# Patient Record
Sex: FEMALE | Race: BLACK OR AFRICAN AMERICAN | Employment: UNEMPLOYED | URBAN - METROPOLITAN AREA
[De-identification: names, ages, dates, MRNs, and addresses within clinical notes are randomized per-mention and may not be internally consistent; named-entity substitution may affect disease eponyms.]

---

## 2018-02-20 ENCOUNTER — OFFICE VISIT (OUTPATIENT)
Dept: INTERNAL MEDICINE | Age: 38
End: 2018-02-20
Attending: STUDENT IN AN ORGANIZED HEALTH CARE EDUCATION/TRAINING PROGRAM

## 2018-02-20 VITALS
BODY MASS INDEX: 19.11 KG/M2 | TEMPERATURE: 98.2 F | OXYGEN SATURATION: 98 % | SYSTOLIC BLOOD PRESSURE: 116 MMHG | RESPIRATION RATE: 20 BRPM | WEIGHT: 122 LBS | DIASTOLIC BLOOD PRESSURE: 69 MMHG | HEART RATE: 77 BPM

## 2018-02-20 DIAGNOSIS — J01.10 ACUTE NON-RECURRENT FRONTAL SINUSITIS: ICD-10-CM

## 2018-02-20 DIAGNOSIS — D64.9 ANEMIA, UNSPECIFIED TYPE: Primary | ICD-10-CM

## 2018-02-20 RX ORDER — AMOXICILLIN AND CLAVULANATE POTASSIUM 875; 125 MG/1; MG/1
1 TABLET, FILM COATED ORAL 2 TIMES DAILY
Qty: 14 TABLET | Refills: 0 | Status: SHIPPED | OUTPATIENT
Start: 2018-02-20 | End: 2018-02-27

## 2018-02-20 ASSESSMENT — ENCOUNTER SYMPTOMS
BACK PAIN: 0
VOMITING: 0
ABDOMINAL PAIN: 0
COUGH: 1
PHOTOPHOBIA: 0
WHEEZING: 0
EYE REDNESS: 0
SORE THROAT: 0
SHORTNESS OF BREATH: 1
HEMOPTYSIS: 0
EYE DISCHARGE: 0
EYE PAIN: 0
SINUS PAIN: 1
SPUTUM PRODUCTION: 1
BLURRED VISION: 0
NAUSEA: 0
STRIDOR: 0
DOUBLE VISION: 0
CONSTIPATION: 0
BLOOD IN STOOL: 0
ORTHOPNEA: 0
DIARRHEA: 0
HEARTBURN: 0

## 2018-02-20 NOTE — PROGRESS NOTES
Department Of Internal Medicine  General Medicine/Primary Care  New Patient Visit    Patient:  Fidel Monroy                                               : 1980  Age: 42 y.o. MRN: [id number]  Date : 2018    History Obtained From:  patient    REASON FOR VISIT:  ED follow up and anemia    HISTORY OF PRESENT ILLNESS:   The patient is a 42 y.o. female who presents with no past medical history presenting with over on week of headaches, cough nasal drainage, coughing with with whitish sputum. Patient has been slightly improving but still has sinus pressure and headache. No sick contacts or recent travel. Patient in the ED was also found to be anemic with Hb 8.8 in the ED. The patient was finishing up her period at the time of her blood draw. Patient does endorse changes in her menstrual period with increased frequency. Patient denies any dark stool or blood in her stool. Past Medical History:    No past medical history on file. Past Surgical History:        Procedure Laterality Date    TUBAL LIGATION         Family History:       Problem Relation Age of Onset    No Known Problems Mother     No Known Problems Father     Seizures Other     Asthma Other     Breast Cancer Other        Social History:   TOBACCO:   reports that she has been smoking. She has been smoking about 1.00 pack per day. She has never used smokeless tobacco.  ETOH:   reports that she drinks alcohol. OCCUPATION:  Stay at home mother    Allergies:  Review of patient's allergies indicates no known allergies. Current Medications:    Prior to Admission medications    Medication Sig Start Date End Date Taking?  Authorizing Provider   amoxicillin-clavulanate (AUGMENTIN) 875-125 MG per tablet Take 1 tablet by mouth 2 times daily for 7 days 18 Yes Marion Wadsworth MD   ferrous sulfate (EMANUEL-CINDI) 325 (65 Fe) MG tablet Take 1 tablet by mouth daily (with breakfast) 18   Melina Baez MD   Pseudoephedrine-APAP-DM (DAYQUIL MULTI-SYMPTOM PO) Take by mouth    Historical Provider, MD   ibuprofen (ADVIL;MOTRIN) 600 MG tablet Take 1 tablet by mouth every 6 hours as needed for Pain 10/18/17   Jovon Marshall MD       Review of Systems   Constitutional: Positive for fever and malaise/fatigue. Negative for chills and weight loss. HENT: Positive for congestion and sinus pain. Negative for ear discharge, ear pain, hearing loss, nosebleeds, sore throat and tinnitus. Eyes: Negative for blurred vision, double vision, photophobia, pain, discharge and redness. Respiratory: Positive for cough, sputum production and shortness of breath. Negative for hemoptysis, wheezing and stridor. Cardiovascular: Positive for chest pain. Negative for palpitations, orthopnea, claudication, leg swelling and PND. Gastrointestinal: Negative for abdominal pain, blood in stool, constipation, diarrhea, heartburn, melena, nausea and vomiting. Genitourinary: Negative for dysuria, flank pain, frequency, hematuria and urgency. Musculoskeletal: Negative for back pain, falls, joint pain, myalgias and neck pain. Skin: Negative for itching and rash. Neurological: Positive for headaches. Negative for dizziness, tingling, tremors, sensory change and speech change. Psychiatric/Behavioral: Negative for depression and suicidal ideas. Physical Exam:      Vitals: /69   Pulse 77   Temp 98.2 °F (36.8 °C)   Resp 20   Wt 122 lb (55.3 kg)   LMP 02/05/2018   SpO2 98%   BMI 19.11 kg/m²     Body mass index is 19.11 kg/m². Wt Readings from Last 3 Encounters:   02/20/18 122 lb (55.3 kg)   02/11/18 125 lb (56.7 kg)   10/18/17 123 lb (55.8 kg)     Physical Exam   Constitutional: She is oriented to person, place, and time. She appears well-developed and well-nourished. HENT:   Head: Normocephalic and atraumatic. Eyes: Conjunctivae are normal. Pupils are equal, round, and reactive to light. Neck: Normal range of motion. No JVD present.

## 2018-03-05 DIAGNOSIS — J01.10 ACUTE NON-RECURRENT FRONTAL SINUSITIS: ICD-10-CM

## 2018-03-05 DIAGNOSIS — D64.9 ANEMIA, UNSPECIFIED TYPE: ICD-10-CM

## 2018-03-05 LAB
BASOPHILS ABSOLUTE: 0 K/UL (ref 0–0.2)
BASOPHILS RELATIVE PERCENT: 0.4 %
EOSINOPHILS ABSOLUTE: 0.1 K/UL (ref 0–0.6)
EOSINOPHILS RELATIVE PERCENT: 1.9 %
FERRITIN: 6.8 NG/ML (ref 15–150)
HCT VFR BLD CALC: 30.2 % (ref 36–48)
HEMOGLOBIN: 9.8 G/DL (ref 12–16)
IMMATURE RETIC FRACT: 0.46 (ref 0.21–0.37)
IRON SATURATION: 7 % (ref 15–50)
IRON: 34 UG/DL (ref 37–145)
LYMPHOCYTES ABSOLUTE: 1.9 K/UL (ref 1–5.1)
LYMPHOCYTES RELATIVE PERCENT: 42.6 %
MCH RBC QN AUTO: 27.9 PG (ref 26–34)
MCHC RBC AUTO-ENTMCNC: 32.3 G/DL (ref 31–36)
MCV RBC AUTO: 86.3 FL (ref 80–100)
MONOCYTES ABSOLUTE: 0.4 K/UL (ref 0–1.3)
MONOCYTES RELATIVE PERCENT: 9.8 %
NEUTROPHILS ABSOLUTE: 2 K/UL (ref 1.7–7.7)
NEUTROPHILS RELATIVE PERCENT: 45.3 %
PDW BLD-RTO: 19.7 % (ref 12.4–15.4)
PLATELET # BLD: 470 K/UL (ref 135–450)
PMV BLD AUTO: 7.3 FL (ref 5–10.5)
RBC # BLD: 3.5 M/UL (ref 4–5.2)
RETICULOCYTE ABSOLUTE COUNT: 0.05 M/UL (ref 0.02–0.1)
RETICULOCYTE COUNT PCT: 1.35 % (ref 0.5–2.18)
TOTAL IRON BINDING CAPACITY: 460 UG/DL (ref 260–445)
WBC # BLD: 4.5 K/UL (ref 4–11)

## 2018-03-20 ENCOUNTER — OFFICE VISIT (OUTPATIENT)
Dept: INTERNAL MEDICINE | Age: 38
End: 2018-03-20
Attending: STUDENT IN AN ORGANIZED HEALTH CARE EDUCATION/TRAINING PROGRAM

## 2018-03-20 VITALS
WEIGHT: 125.2 LBS | BODY MASS INDEX: 19.65 KG/M2 | DIASTOLIC BLOOD PRESSURE: 87 MMHG | OXYGEN SATURATION: 100 % | TEMPERATURE: 97.8 F | SYSTOLIC BLOOD PRESSURE: 127 MMHG | RESPIRATION RATE: 16 BRPM | HEIGHT: 67 IN | HEART RATE: 64 BPM

## 2018-03-20 DIAGNOSIS — D50.0 IRON DEFICIENCY ANEMIA DUE TO CHRONIC BLOOD LOSS: ICD-10-CM

## 2018-03-20 DIAGNOSIS — G47.00 INSOMNIA, UNSPECIFIED TYPE: Primary | ICD-10-CM

## 2018-03-20 RX ORDER — IRON POLYSACCHARIDE COMPLEX 150 MG
150 CAPSULE ORAL 2 TIMES DAILY
Qty: 60 CAPSULE | Refills: 3 | Status: SHIPPED | OUTPATIENT
Start: 2018-03-20 | End: 2019-09-10

## 2018-03-20 ASSESSMENT — ENCOUNTER SYMPTOMS
EYE PAIN: 0
SPUTUM PRODUCTION: 0
HEMOPTYSIS: 0
ABDOMINAL PAIN: 0
COUGH: 0
CONSTIPATION: 0
VOMITING: 0
DIARRHEA: 0
ORTHOPNEA: 0
NAUSEA: 0
HEARTBURN: 0
BACK PAIN: 0
PHOTOPHOBIA: 0
BLURRED VISION: 0
DOUBLE VISION: 0

## 2018-03-20 NOTE — PROGRESS NOTES
[address] [facility]       NURSING PROGRESS NOTE      March 20, 2018  Tennis Schimke    Chief Complaint:   Chief Complaint   Patient presents with    Follow-up     anemia       Constitutional: alert and oriented; calm; denies fever,chills,illness; weight stable. Eyes: negative  Ears, nose, mouth, throat, and face: negative  states recent URI and no symptoms now. Respiratory: negative  Cardiovascular: negative  Gastrointestinal: negative  Genitourinary: negative  Integument/breast: negative  Hematologic/lymphatic: states she is here to followup on anemia; she has consulted with GYN doctor at [facility] and they are seeking approval for medication to decrease menses flow contributing to the anemia. She stopped taking the Ferrous Sulfate one week ago due to GI upset.   Musculoskeletal: negative  Neurological: negative  Diabetes: No    Pain Assessment:  Pain Present: no  Pain Score: 0  Pain Quality/Description: no c/o pain    Mobility/Safety/Self-Care:  Steady Gait: Yes  History of Falls: No   History of a Fall within the last 30 days No  Assistive Device: None  Poor Hygiene: No    Psychosocial:   Depression: No  If YES,    Does Patient express current thoughts of harming self or others?: No  Anxious: No  Insomnia: Yes  Inappropriate Behavior: No  Alcohol Abuse: no  Substance Abuse: no  Signs of Physical Abuse: No  Signs of Emotional Abuse: No    Educational:  Identify the learner who is being assessed for education:  patient                    Ability to Learn:  Exhibits ability to grasp concepts and respond to questions: High  Ready to Learn: Yes  calm   Preferred Method of Learning:  written  Barriers to Learning: Verbalizes interest  Special Considerations due to cultural, Methodist, spiritual beliefs:  No  Language:  English  :  No    Note:         10:42 AM 3/20/2018
She is alert and oriented to person, place, and time. No cranial nerve deficit. Coordination normal.   Skin: Skin is warm and dry. No erythema. LABS:    Chemistry:  No results for input(s): BUN, CREATININE, NA, K, CO2, CL, MG, PHOS, AST, ALT, ALB, PROT in the last 72 hours. Invalid input(s): GLU, CA, TBILI, DBILI, ALP, GLUFASTING    No results for input(s): ALKPHOS, ALT, AST, PROT, BILITOT, BILIDIR, LABALBU in the last 72 hours. [unfilled]    No results for input(s): [place], LABMICR, MICROALBUR, Tobi Groom in the last 72 hours. No results found for: TSHFT4, TSH    Hematology:  No results for input(s): WBC, HGB, HCT, PLT, MCV, MCH, MCHC, RDW, EOSABS in the last 72 hours. Invalid input(s): NEUTP, LYMPHP, MONOSP, EOSP, BASOP, NEUTABS, LYMPHABS, MONOABS, BASOABS    Lab Results   Component Value Date    IRON 34 (L) 03/05/2018    TIBC 460 (H) 03/05/2018    FERRITIN 6.8 (L) 03/05/2018       Lipid:  No results found for: CHOL, HDL, LDLCALC, TRIG    U/A:  Lab Results   Component Value Date    LABMICR Yes 10/18/2017       Imaging:   No results found. Active Problems:     1. Iron deficiency anemia  2. Insomnia  3. Acute sinusitis    Assessment/Plan:   1. Iron deficiency anemia likely from menstrual cycle  - Patient with increase frequency of her periods with no bloody or melenotic stools. - Iron studies consistent with iron deficiency anemia  - Iron supplementation switched to Ferrex 150 BID    - Patient is establishing care with [facility]    2. Insomnia  - Likely from poor sleep hygiene  - Patient instructed to limit blue light exposure  - educated patient that bed is for sleep  - recommend over the coutner Melatonin 3-5 mg per night prn    3. Acute sinusitis REsolved           Case will be discussed with preceptor.      Rocio Soto MD  Internal Medicine Resident  Pager: [phone]  3/20/2018, 10:47 AM

## 2018-03-20 NOTE — PATIENT INSTRUCTIONS
their patients and/or to serve consumers viewing this service as a supplement to, and not a substitute for, the expertise, skill, knowledge and judgment of healthcare practitioners. The absence of a warning for a given drug or drug combination in no way should be construed to indicate that the drug or drug combination is safe, effective or appropriate for any given patient. [facility] does not assume any responsibility for any aspect of healthcare administered with the aid of information [facility] provides. The information contained herein is not intended to cover all possible uses, directions, precautions, warnings, drug interactions, allergic reactions, or adverse effects. If you have questions about the drugs you are taking, check with your doctor, nurse or pharmacist.  Copyright 4619-2727 [address]: 2.03. Revision date: 7/30/2014. Care instructions adapted under license by [facility] ([facility]). If you have questions about a medical condition or this instruction, always ask your healthcare professional. [place] [address] any warranty or liability for your use of this information.

## 2018-11-23 ENCOUNTER — HOSPITAL ENCOUNTER (EMERGENCY)
Age: 38
Discharge: HOME | End: 2018-11-23
Attending: EMERGENCY MEDICINE
Payer: MEDICAID

## 2018-11-23 VITALS
SYSTOLIC BLOOD PRESSURE: 133 MMHG | TEMPERATURE: 98 F | BODY MASS INDEX: 19.58 KG/M2 | HEART RATE: 110 BPM | WEIGHT: 125 LBS | RESPIRATION RATE: 18 BRPM | DIASTOLIC BLOOD PRESSURE: 100 MMHG | OXYGEN SATURATION: 98 %

## 2018-11-23 DIAGNOSIS — K08.89 PAIN, DENTAL: Primary | ICD-10-CM

## 2018-11-23 PROCEDURE — 6370000000 HC RX 637 (ALT 250 FOR IP): Performed by: EMERGENCY MEDICINE

## 2018-11-23 PROCEDURE — 99282 EMERGENCY DEPT VISIT SF MDM: CPT

## 2018-11-23 RX ORDER — IBUPROFEN 800 MG/1
800 TABLET ORAL EVERY 6 HOURS PRN
Qty: 30 TABLET | Refills: 0 | Status: SHIPPED | OUTPATIENT
Start: 2018-11-23 | End: 2019-09-10

## 2018-11-23 RX ORDER — BUPIVACAINE HYDROCHLORIDE 5 MG/ML
30 INJECTION, SOLUTION EPIDURAL; INTRACAUDAL ONCE
Status: DISCONTINUED | OUTPATIENT
Start: 2018-11-23 | End: 2018-11-23 | Stop reason: HOSPADM

## 2018-11-23 RX ORDER — IBUPROFEN 400 MG/1
800 TABLET ORAL ONCE
Status: COMPLETED | OUTPATIENT
Start: 2018-11-23 | End: 2018-11-23

## 2018-11-23 RX ORDER — PENICILLIN V POTASSIUM 500 MG/1
500 TABLET ORAL 4 TIMES DAILY
Qty: 28 TABLET | Refills: 0 | Status: SHIPPED | OUTPATIENT
Start: 2018-11-23 | End: 2018-11-30

## 2018-11-23 RX ORDER — PENICILLIN V POTASSIUM 500 MG/1
500 TABLET ORAL ONCE
Status: COMPLETED | OUTPATIENT
Start: 2018-11-23 | End: 2018-11-23

## 2018-11-23 RX ORDER — CHLORHEXIDINE GLUCONATE 0.12 MG/ML
15 RINSE ORAL 2 TIMES DAILY
Qty: 420 ML | Refills: 0 | Status: SHIPPED | OUTPATIENT
Start: 2018-11-23 | End: 2018-12-07

## 2018-11-23 RX ADMIN — IBUPROFEN 800 MG: 400 TABLET, FILM COATED ORAL at 11:22

## 2018-11-23 RX ADMIN — PENICILLIN V POTASIUM 500 MG: 500 TABLET OROPHARYNGEAL at 11:21

## 2018-11-23 ASSESSMENT — ENCOUNTER SYMPTOMS
ABDOMINAL PAIN: 0
SHORTNESS OF BREATH: 0
BACK PAIN: 0

## 2018-11-23 ASSESSMENT — PAIN SCALES - GENERAL
PAINLEVEL_OUTOF10: 10
PAINLEVEL_OUTOF10: 10

## 2018-11-23 ASSESSMENT — PAIN DESCRIPTION - LOCATION: LOCATION: TEETH

## 2018-11-23 NOTE — ED PROVIDER NOTES
14 days     Dispense:  420 mL     Refill:  0         MEDICAL DECISION MAKING / ASSESSMENT / Dorita Edilberto is a 34 y.o. female presents to the emergency department with pain associated with a broken tooth. No evidence of significant infection or abscess that would require IV antibiotics or drainage. We will start her on penicillin and Peridex, as this may be beneficial given the acute change in her symptoms. She had significant relief of her symptoms in the emergency department with ibuprofen, therefore that will be continued. Clinical Impression     1.  Pain, dental        Disposition     PATIENT REFERRED TO:  Dentist of choice      For re-evaluation, follow-up, and discuss ED visit and probable dental extraction (pulling your tooth)      DISCHARGE MEDICATIONS:  Discharge Medication List as of 11/23/2018  1:00 PM      START taking these medications    Details   penicillin v potassium (VEETID) 500 MG tablet Take 1 tablet by mouth 4 times daily for 7 days, Disp-28 tablet, R-0Print      chlorhexidine (PERIDEX) 0.12 % solution Take 15 mLs by mouth 2 times daily for 14 days, Disp-420 mL, R-0Print             DISPOSITION Decision To Discharge 11/23/2018 12:51:16 PM          Binu Acosta MD  11/23/18 1524

## 2019-09-10 ENCOUNTER — HOSPITAL ENCOUNTER (EMERGENCY)
Age: 39
Discharge: HOME | End: 2019-09-10
Attending: EMERGENCY MEDICINE
Payer: MEDICAID

## 2019-09-10 VITALS
HEIGHT: 67 IN | TEMPERATURE: 98.2 F | HEART RATE: 58 BPM | RESPIRATION RATE: 15 BRPM | DIASTOLIC BLOOD PRESSURE: 96 MMHG | BODY MASS INDEX: 19.3 KG/M2 | OXYGEN SATURATION: 100 % | SYSTOLIC BLOOD PRESSURE: 150 MMHG | WEIGHT: 123 LBS

## 2019-09-10 DIAGNOSIS — S05.01XA ABRASION OF RIGHT CORNEA, INITIAL ENCOUNTER: Primary | ICD-10-CM

## 2019-09-10 PROCEDURE — 99282 EMERGENCY DEPT VISIT SF MDM: CPT

## 2019-09-10 RX ORDER — ERYTHROMYCIN 5 MG/G
OINTMENT OPHTHALMIC
Qty: 1 G | Refills: 0 | Status: SHIPPED | OUTPATIENT
Start: 2019-09-10 | End: 2021-08-17

## 2019-09-10 RX ORDER — TETRACAINE HYDROCHLORIDE 5 MG/ML
1 SOLUTION OPHTHALMIC ONCE
Status: DISCONTINUED | OUTPATIENT
Start: 2019-09-10 | End: 2019-09-10 | Stop reason: HOSPADM

## 2019-09-10 ASSESSMENT — PAIN DESCRIPTION - FREQUENCY: FREQUENCY: CONTINUOUS

## 2019-09-10 ASSESSMENT — ENCOUNTER SYMPTOMS
EYE REDNESS: 1
EYE PAIN: 1

## 2019-09-10 ASSESSMENT — PAIN DESCRIPTION - LOCATION: LOCATION: EYE

## 2019-09-10 ASSESSMENT — PAIN DESCRIPTION - DESCRIPTORS: DESCRIPTORS: BURNING

## 2019-09-10 ASSESSMENT — PAIN DESCRIPTION - ORIENTATION: ORIENTATION: RIGHT

## 2019-09-10 ASSESSMENT — PAIN DESCRIPTION - PAIN TYPE: TYPE: ACUTE PAIN

## 2019-09-10 NOTE — ED PROVIDER NOTES
[address] [facility]          ATTENDING PHYSICIAN NOTE       Date of evaluation: 9/10/2019    Chief Complaint     Eye Problem      History of Present Illness     Jeri Spence is a 34 y.o. female with right eye irritation. Patient states she began having irritation in the right eye earlier this evening. She cannot think of a specific event but feels as though she may have had a foreign body in the eye or accidentally scratched it. She does not wear contacts. She has had \" an eyelid cyst\" drained on that I previously but no surgeries on the eye itself. No fevers, she does have a mild associated headache. She notes generally blurred vision due to the tearing in her eye. Review of Systems     Review of Systems   Constitutional: Negative for fatigue. Eyes: Positive for pain and redness. Past Medical, Surgical, Family, and Social History     She has no past medical history on file. She has a past surgical history that includes Tubal ligation. Her family history includes Asthma in an other family member; Breast Cancer in an other family member; No Known Problems in her father and mother; Seizures in an other family member. She reports that she has been smoking. She has been smoking about 1.00 pack per day. She has never used smokeless tobacco. She reports that she drinks alcohol. She reports that she has current or past drug history. Drug: Marijuana. Medications     Previous Medications    No medications on file       Allergies     She has No Known Allergies. Physical Exam     INITIAL VITALS: BP: (!) 150/96, Temp: 98.2 °F (36.8 °C), Pulse: 58, Resp: 15, SpO2: 100 %     Nursing note and vitals reviewed. General:  Adult female, alert and appropriately interactive. In no distress. HENT: Normocephalic and atraumatic. External ears normal. Nose appears normal externally. Eyes: Conjunctivae injected OD. No obvious foreign body on eyelid eversion.   Fluorescein stable condition with written and verbal instructions for which to return to the ED. Clinical Impression     1.  Abrasion of right cornea, initial encounter        Disposition     PATIENT REFERRED TO:  [facility] Emergency Department  [address] [place] [address]  [phone]    If symptoms worsen      DISCHARGE MEDICATIONS:  New Prescriptions    ERYTHROMYCIN [facility]) 5 MG/GM OPHTHALMIC OINTMENT    Apply to affected eye three times daily for two days       DISPOSITION Decision To Discharge 09/10/2019 04:26:40 AM       Dakota Burdick MD  09/10/19 9156

## 2020-10-20 ENCOUNTER — HOSPITAL ENCOUNTER (EMERGENCY)
Age: 40
Discharge: HOME | End: 2020-10-20
Attending: EMERGENCY MEDICINE
Payer: COMMERCIAL

## 2020-10-20 ENCOUNTER — APPOINTMENT (OUTPATIENT)
Dept: GENERAL RADIOLOGY | Age: 40
End: 2020-10-20
Payer: COMMERCIAL

## 2020-10-20 VITALS
TEMPERATURE: 98.3 F | BODY MASS INDEX: 19.62 KG/M2 | WEIGHT: 125 LBS | HEART RATE: 84 BPM | DIASTOLIC BLOOD PRESSURE: 96 MMHG | OXYGEN SATURATION: 100 % | HEIGHT: 67 IN | SYSTOLIC BLOOD PRESSURE: 140 MMHG | RESPIRATION RATE: 18 BRPM

## 2020-10-20 PROCEDURE — 99283 EMERGENCY DEPT VISIT LOW MDM: CPT

## 2020-10-20 PROCEDURE — 72100 X-RAY EXAM L-S SPINE 2/3 VWS: CPT

## 2020-10-20 PROCEDURE — 73502 X-RAY EXAM HIP UNI 2-3 VIEWS: CPT

## 2020-10-20 PROCEDURE — 6370000000 HC RX 637 (ALT 250 FOR IP): Performed by: NURSE PRACTITIONER

## 2020-10-20 PROCEDURE — 6360000002 HC RX W HCPCS: Performed by: NURSE PRACTITIONER

## 2020-10-20 PROCEDURE — 96372 THER/PROPH/DIAG INJ SC/IM: CPT

## 2020-10-20 RX ORDER — DIAZEPAM 5 MG/1
5 TABLET ORAL ONCE
Status: COMPLETED | OUTPATIENT
Start: 2020-10-20 | End: 2020-10-20

## 2020-10-20 RX ORDER — HYDROCODONE BITARTRATE AND ACETAMINOPHEN 5; 325 MG/1; MG/1
2 TABLET ORAL ONCE
Status: COMPLETED | OUTPATIENT
Start: 2020-10-20 | End: 2020-10-20

## 2020-10-20 RX ORDER — DIAZEPAM 5 MG/1
5 TABLET ORAL EVERY 8 HOURS PRN
Qty: 10 TABLET | Refills: 0 | Status: SHIPPED | OUTPATIENT
Start: 2020-10-20 | End: 2020-10-30

## 2020-10-20 RX ORDER — LIDOCAINE 4 G/G
1 PATCH TOPICAL ONCE
Status: DISCONTINUED | OUTPATIENT
Start: 2020-10-20 | End: 2020-10-21 | Stop reason: HOSPADM

## 2020-10-20 RX ORDER — KETOROLAC TROMETHAMINE 30 MG/ML
15 INJECTION, SOLUTION INTRAMUSCULAR; INTRAVENOUS ONCE
Status: COMPLETED | OUTPATIENT
Start: 2020-10-20 | End: 2020-10-20

## 2020-10-20 RX ORDER — LIDOCAINE 50 MG/G
1 PATCH TOPICAL DAILY
Qty: 30 PATCH | Refills: 0 | Status: SHIPPED | OUTPATIENT
Start: 2020-10-20 | End: 2021-08-23

## 2020-10-20 RX ORDER — NAPROXEN 500 MG/1
500 TABLET ORAL 2 TIMES DAILY PRN
Qty: 40 TABLET | Refills: 0 | Status: SHIPPED | OUTPATIENT
Start: 2020-10-20 | End: 2022-08-01

## 2020-10-20 RX ADMIN — KETOROLAC TROMETHAMINE 15 MG: 30 INJECTION, SOLUTION INTRAMUSCULAR at 21:08

## 2020-10-20 RX ADMIN — HYDROCODONE BITARTRATE AND ACETAMINOPHEN 2 TABLET: 5; 325 TABLET ORAL at 22:35

## 2020-10-20 RX ADMIN — DIAZEPAM 5 MG: 5 TABLET ORAL at 21:07

## 2020-10-20 ASSESSMENT — ENCOUNTER SYMPTOMS
NAUSEA: 0
RESPIRATORY NEGATIVE: 1
VOMITING: 0
DIARRHEA: 0
BACK PAIN: 1
ABDOMINAL PAIN: 0

## 2020-10-20 ASSESSMENT — PAIN SCALES - GENERAL
PAINLEVEL_OUTOF10: 7
PAINLEVEL_OUTOF10: 10
PAINLEVEL_OUTOF10: 6

## 2020-10-20 ASSESSMENT — PAIN DESCRIPTION - DESCRIPTORS: DESCRIPTORS: ACHING;SHARP;STABBING

## 2020-10-20 ASSESSMENT — PAIN DESCRIPTION - PAIN TYPE: TYPE: ACUTE PAIN

## 2020-10-20 ASSESSMENT — PAIN DESCRIPTION - FREQUENCY: FREQUENCY: CONTINUOUS

## 2020-10-20 ASSESSMENT — PAIN DESCRIPTION - LOCATION: LOCATION: HIP

## 2020-10-20 ASSESSMENT — PAIN DESCRIPTION - ORIENTATION: ORIENTATION: LEFT

## 2020-10-21 NOTE — ED PROVIDER NOTES
[address] [facility] [address] ENCOUNTER          NURSE PRACTITIONER NOTE       Date of evaluation: 10/20/2020    Chief Complaint     Hip Pain and Leg Pain      History of Present Illness     Willis Perla is a 37 y.o. female who presents to the emergency department with a complaint of ongoing left-sided back pain with radiation down her left posterior leg, no associated bowel or bladder incontinence. This been ongoing for the past 2 weeks and she redecorated her home and moved furniture. She denies any injuries or falls. Denies associated numbness or tingling. Tried ibuprofen without relief as well as Aleve (tried these for 2-3 doses). States she presents today because when she coughed in her car she felt acute sudden onset of shooting pain down her left leg, and then when she tried to get out of her car at home she was unable to stand due to the pain. She currently to pain a 9 out of 10, sharp and stabbing in nature and worse with attempts at movement. Denies history of IV drug use. Review of Systems     Review of Systems   Constitutional: Negative. Respiratory: Negative. Cardiovascular: Negative. Gastrointestinal: Negative for abdominal pain, diarrhea, nausea and vomiting. Genitourinary: Negative for dysuria, flank pain, frequency and urgency. Musculoskeletal: Positive for back pain. Skin: Negative. Allergic/Immunologic: Negative for immunocompromised state. Neurological: Negative for numbness. Hematological: Does not bruise/bleed easily. Psychiatric/Behavioral: Negative. Past Medical, Surgical, Family, and Social History     She has no past medical history on file. She has a past surgical history that includes Tubal ligation. Her family history includes Asthma in an other family member; Breast Cancer in an other family member; No Known Problems in her father and mother; Seizures in an other family member. She reports that she has been smoking.  She has been smoking about 1.00 pack per day. She has never used smokeless tobacco. She reports current alcohol use. She reports current drug use. Drug: Marijuana. Medications     Discharge Medication List as of 10/20/2020 10:38 PM      CONTINUE these medications which have NOT CHANGED    Details   erythromycin (ROMYCIN) 5 MG/GM ophthalmic ointment Apply to affected eye three times daily for two days, Disp-1 g, R-0, Print             Allergies     She has No Known Allergies. Physical Exam     INITIAL VITALS: BP: (!) 166/94, Temp: 98.3 °F (36.8 °C), Pulse: 84, Resp: 18, SpO2: 98 %   Physical Exam  Vitals signs and nursing note reviewed. Constitutional:       Appearance: Normal appearance. Musculoskeletal:         General: Tenderness present. Comments: TTP of the left lumbar paraspinal musculature and SI joint with pain reproduced over palpation of the SI joint. +2 DTRs, intact sensation and intact distal pulses. patient unable to sit up on the side of the bed without active spasming and increased pain. After pain control, gait noted to be without ataxia. Skin:     General: Skin is warm and dry. Findings: No rash. Neurological:      Mental Status: She is alert and oriented to person, place, and time. Psychiatric:         Mood and Affect: Mood normal.         Behavior: Behavior normal.         Diagnostic Results     RADIOLOGY:  XR HIP 2-3 VW W PELVIS LEFT   Final Result      No acute osseous findings. XR LUMBAR SPINE (2-3 VIEWS)   Final Result      No acute osseous findings. LABS:   No results found for this visit on 10/20/20. RECENT VITALS:  BP: (!) 140/96, Temp: 98.3 °F (36.8 °C), Pulse: 84, Resp: 18, SpO2: 100 %       ED Course     Nursing Notes, Past Medical Hx, Past Surgical Hx, Social Hx, Allergies, and Family Hx were reviewed.     The patient was given the following medications:  Orders Placed This Encounter   Medications    lidocaine 4 % external patch 1 patch    ketorolac (TORADOL) injection 15 mg    diazePAM (VALIUM) tablet 5 mg    HYDROcodone-acetaminophen (NORCO) 5-325 MG per tablet 2 tablet    diazePAM (VALIUM) 5 MG tablet     Sig: Take 1 tablet by mouth every 8 hours as needed (muscle tightness/back pain) for up to 10 days. Dispense:  10 tablet     Refill:  0    naproxen (NAPROSYN) 500 MG tablet     Sig: Take 1 tablet by mouth 2 times daily as needed for Pain     Dispense:  40 tablet     Refill:  0    lidocaine (LIDODERM) 5 %     Sig: Place 1 patch onto the skin daily 12 hours on, 12 hours off. Dispense:  30 patch     Refill:  0            CONSULTS:  None    MEDICAL DECISION MAKING / ASSESSMENT / [place] An[place]ell is a 37 y.o. female who presents with complaints as noted in HPI. Patient presents to the emergency department with a complaint of 2 weeks of left-sided back pain, hip pain with radiation to her leg. No red flags. Patient refuses to move her hip initially, however after Lidoderm patch, Toradol and Valium is able to move her hip with pain, unable to ambulate. X-rays of the hip and the lumbar spine were obtained due to patient's significant pain, these are unremarkable. I am not concerned for cauda equina, cord compression or epidural abscess. Patient has been walking for the past 2 weeks, not concerned for an occult injury. Patient will be discharged home with prescriptions for scheduled Naprosyn, Lidoderm patches and Valium to help with muscle spasms. Instructed to follow-up with her primary care provider in 1 to 2 weeks, given strict return precautions which she verbalized understanding of. This patient was also evaluated by the attending physician. All care plans were discussed and agreed upon. Clinical Impression     1.  Sciatica of left side        Disposition     PATIENT REFERRED TO:  [facility] Emergency Department  [address] [facility] [address]  [phone]    If symptoms worsen      DISCHARGE MEDICATIONS:  Discharge Medication List as of 10/20/2020 10:38 PM      START taking these medications    Details   diazePAM (VALIUM) 5 MG tablet Take 1 tablet by mouth every 8 hours as needed (muscle tightness/back pain) for up to 10 days. , Disp-10 tablet,R-0Print      naproxen (NAPROSYN) 500 MG tablet Take 1 tablet by mouth 2 times daily as needed for Pain, Disp-40 tablet,R-0Print      lidocaine (LIDODERM) 5 % Place 1 patch onto the skin daily 12 hours on, 12 hours off., Disp-30 patch,R-0Print             DISPOSITION  Home in stable condition      ELIEZER Dickson - CNP  10/20/20 9822

## 2020-10-21 NOTE — ED NOTES
Discharge instructions reviewed with patient. Patient discharged home with friend.        Connor Medrano RN  10/20/20 2770

## 2021-07-31 ENCOUNTER — HOSPITAL ENCOUNTER (EMERGENCY)
Age: 41
Discharge: HOME | End: 2021-07-31
Attending: EMERGENCY MEDICINE
Payer: COMMERCIAL

## 2021-07-31 VITALS
TEMPERATURE: 99.3 F | BODY MASS INDEX: 21.34 KG/M2 | HEART RATE: 84 BPM | HEIGHT: 64 IN | SYSTOLIC BLOOD PRESSURE: 153 MMHG | WEIGHT: 125 LBS | OXYGEN SATURATION: 99 % | RESPIRATION RATE: 16 BRPM | DIASTOLIC BLOOD PRESSURE: 108 MMHG

## 2021-07-31 DIAGNOSIS — L03.116 CELLULITIS OF LEFT LOWER EXTREMITY: Primary | ICD-10-CM

## 2021-07-31 PROCEDURE — 99282 EMERGENCY DEPT VISIT SF MDM: CPT

## 2021-07-31 RX ORDER — CEPHALEXIN 500 MG/1
500 CAPSULE ORAL 4 TIMES DAILY
Qty: 40 CAPSULE | Refills: 0 | Status: SHIPPED | OUTPATIENT
Start: 2021-07-31 | End: 2021-08-10

## 2021-07-31 RX ORDER — IBUPROFEN 600 MG/1
600 TABLET ORAL EVERY 6 HOURS PRN
Qty: 30 TABLET | Refills: 0 | Status: SHIPPED | OUTPATIENT
Start: 2021-07-31 | End: 2022-08-01

## 2021-07-31 ASSESSMENT — PAIN DESCRIPTION - LOCATION: LOCATION: LEG

## 2021-07-31 ASSESSMENT — PAIN SCALES - GENERAL: PAINLEVEL_OUTOF10: 6

## 2021-07-31 ASSESSMENT — PAIN DESCRIPTION - PAIN TYPE: TYPE: ACUTE PAIN

## 2021-07-31 NOTE — ED PROVIDER NOTES
[address] [facility]          ATTENDING PHYSICIAN NOTE       Date of evaluation: 7/31/2021    Chief Complaint     Bleeding/Bruising ([address] ON BILATERAL OUTTER THIGHS FOR X1 YEAR)      History of Present Illness     Summer Marcano is a 41 y.o. female who presents to the emergency department with the complaint of thigh pain. The patient reports that she has had 2 skin lesions on her bilateral lateral thighs for the past year and the one on her left lateral thigh became more painful and swollen over the course the past several days. Denies fever chills nausea vomiting. She reports some redness and warmth at the site of the left lateral thigh skin lesion. She denies any known antecedent trauma. Otherwise without complaints. Review of Systems     As documented in the HPI, otherwise all other systems were reviewed and were negative. Past Medical, Surgical, Family, and Social History     She has no past medical history on file. She has a past surgical history that includes Tubal ligation. Her family history includes Asthma in an other family member; Breast Cancer in an other family member; No Known Problems in her father and mother; Seizures in an other family member. She reports that she has been smoking. She has been smoking about 1.00 pack per day. She has never used smokeless tobacco. She reports current alcohol use. She reports current drug use. Drug: Marijuana. Medications     Previous Medications    ERYTHROMYCIN (ROMYCIN) 5 MG/GM OPHTHALMIC OINTMENT    Apply to affected eye three times daily for two days    LIDOCAINE (LIDODERM) 5 %    Place 1 patch onto the skin daily 12 hours on, 12 hours off. NAPROXEN (NAPROSYN) 500 MG TABLET    Take 1 tablet by mouth 2 times daily as needed for Pain       Allergies     She has No Known Allergies.     Physical Exam     INITIAL VITALS: BP: (!) 153/108, Temp: 99.3 °F (37.4 °C), Pulse: 84, Resp: 16, SpO2: 99 %   General: Well-appearing 41year-old sitting in bed no apparent cardiorespiratory distress  HEENT:  head is atraumatic, sclera are clear, oropharynx is nonerythematous, mucus membranes are moist  Neck: Trachea midline  Chest: Nonlabored respirations  Cardiovascular: Regular, rate, and rhythm, 2+ radial pulses bilaterally  Abdominal: Nondistended abdomen  Skin: On the right lateral thigh there is an area of hyperpigmentation and mild induration of the skin, on the left lateral thigh the patient has a large area of induration of the skin with some mild erythema tenderness to palpation and evidence of subcutaneous edema on ultrasound without evidence of focal abscess or fluid collection  Musculoskeletal: no obvious deformities, no tenderness to palpation diffusely  Neurologic:  speech is clear and intact without dysarthria, gait is intact    Diagnostic Results       RADIOLOGY:  No orders to display       LABS:   No results found for this visit on 07/31/21. RECENT VITALS:  BP: (!) 153/108, Temp: 99.3 °F (37.4 °C), Pulse: 84, Resp: 16, SpO2: 99 %         ED Course     Nursing Notes, Past Medical Hx, Past Surgical Hx, Social Hx, Allergies, and Family Hx were reviewed. The patient was given the following medications:  Orders Placed This Encounter   Medications    ibuprofen (ADVIL;MOTRIN) 600 MG tablet     Sig: Take 1 tablet by mouth every 6 hours as needed for Pain     Dispense:  30 tablet     Refill:  0    cephALEXin (KEFLEX) 500 MG capsule     Sig: Take 1 capsule by mouth 4 times daily for 10 days     Dispense:  40 capsule     Refill:  0       CONSULTS:  None    MEDICAL DECISION MAKING / ASSESSMENT / Radha Milagros is a 41 y.o. female complaints of skin lesions.   On physical examination the left lateral thigh demonstrated induration some erythema of the skin and warmth of the skin with ultrasound findings consistent with subcutaneous edema and a clinical picture that appear to be most consistent with likely cellulitis. No abscess was seen. Is unclear why the patient has these lesions in the first place but could be secondary to chronic pressure and/or trauma. The patient will be given a prescription for Keflex discharged home with PCP follow-up and told to return to the emergency department for worsening symptoms. Clinical Impression     1.  Cellulitis of left lower extremity        Disposition     PATIENT REFERRED TO:  [facility]) Pre-Services  [phone]          DISCHARGE MEDICATIONS:  New Prescriptions    CEPHALEXIN (KEFLEX) 500 MG CAPSULE    Take 1 capsule by mouth 4 times daily for 10 days    IBUPROFEN (ADVIL;MOTRIN) 600 MG TABLET    Take 1 tablet by mouth every 6 hours as needed for Pain       DISPOSITION Decision To Discharge 07/31/2021 12:15:42 PM           Destinee Justin MD  07/31/21 4224

## 2021-07-31 NOTE — ED NOTES
Pt presents to the ED from home with c/o bilateral thigh bruising for x1 year.      Karla Hooker RN  07/31/21 9874

## 2021-07-31 NOTE — ED NOTES
Patient prepared for and ready to be discharged. Patient discharged at this time in no acute distress after verbalizing understanding of discharge instructions. Patient left after receiving After Visit Summary instructions.         Lucero Dos Santos RN  07/31/21 7543

## 2021-08-17 ENCOUNTER — OFFICE VISIT (OUTPATIENT)
Dept: PRIMARY CARE CLINIC | Age: 41
End: 2021-08-17
Payer: COMMERCIAL

## 2021-08-17 VITALS
TEMPERATURE: 97.4 F | SYSTOLIC BLOOD PRESSURE: 132 MMHG | HEART RATE: 77 BPM | HEIGHT: 67 IN | WEIGHT: 132.6 LBS | BODY MASS INDEX: 20.81 KG/M2 | OXYGEN SATURATION: 99 % | DIASTOLIC BLOOD PRESSURE: 87 MMHG

## 2021-08-17 DIAGNOSIS — M21.952 ACQUIRED DEFORMITY OF LEFT THIGH: ICD-10-CM

## 2021-08-17 DIAGNOSIS — L98.9 SKIN LESION OF LEFT LEG: Primary | ICD-10-CM

## 2021-08-17 PROCEDURE — 99202 OFFICE O/P NEW SF 15 MIN: CPT | Performed by: FAMILY MEDICINE

## 2021-08-17 PROCEDURE — G8427 DOCREV CUR MEDS BY ELIG CLIN: HCPCS | Performed by: FAMILY MEDICINE

## 2021-08-17 PROCEDURE — 4004F PT TOBACCO SCREEN RCVD TLK: CPT | Performed by: FAMILY MEDICINE

## 2021-08-17 PROCEDURE — G8420 CALC BMI NORM PARAMETERS: HCPCS | Performed by: FAMILY MEDICINE

## 2021-08-17 RX ORDER — DULOXETIN HYDROCHLORIDE 20 MG/1
20 CAPSULE, DELAYED RELEASE ORAL DAILY
COMMUNITY
End: 2021-11-08 | Stop reason: SDUPTHER

## 2021-08-17 RX ORDER — MIRTAZAPINE 15 MG/1
15 TABLET, FILM COATED ORAL NIGHTLY
COMMUNITY
End: 2022-08-01

## 2021-08-17 ASSESSMENT — PATIENT HEALTH QUESTIONNAIRE - PHQ9
SUM OF ALL RESPONSES TO PHQ QUESTIONS 1-9: 0
SUM OF ALL RESPONSES TO PHQ9 QUESTIONS 1 & 2: 0
1. LITTLE INTEREST OR PLEASURE IN DOING THINGS: 0
2. FEELING DOWN, DEPRESSED OR HOPELESS: 0

## 2021-08-17 NOTE — PROGRESS NOTES
[address] [address] PRIMARY CARE  [address] [facility] [address]  Dept: [phone]  Dept Fax: [phone]     8/17/2021      Sneha Seay   1980     Chief Complaint   Patient presents with    Lesion(s)     on both outer thighs,  seen at [facility] ER       HPI  Pt comes in today to establish care. Does not have a PCP. Went to ER on 7/31/21 for c/o spots on her legs. Spots first started over 1.5 years ago. Left side is much worse than the right. Started as itchy rash and progressed from there. No inciting factor that she is aware of. Area is still mildly itchy, burning sensation and now painful at times. Has used OTC vaseline and bandages with no improvement. Bedside US in ER done with possible edema/thickening of the skin, suggestive of cellulitis and therefore treated with keflex. Follows with GYN at [facility] and psychiatrist.     Prior to Visit Medications    Medication Sig Taking? Authorizing Provider   mirtazapine (REMERON) 15 MG tablet Take 15 mg by mouth nightly Yes Historical Provider, MD   DULoxetine (CYMBALTA) 20 MG extended release capsule Take 20 mg by mouth daily Yes Historical Provider, MD   ibuprofen (ADVIL;MOTRIN) 600 MG tablet Take 1 tablet by mouth every 6 hours as needed for Pain Yes Guido Nevarez MD   lidocaine (LIDODERM) 5 % Place 1 patch onto the skin daily 12 hours on, 12 hours off.  Yes ELIEZER Marks CNP   naproxen (NAPROSYN) 500 MG tablet Take 1 tablet by mouth 2 times daily as needed for Pain  ELIEZER Marks CNP        Past Medical History:   Diagnosis Date    Depression         Social History     Tobacco Use    Smoking status: Current Every Day Smoker     Packs/day: 1.00    Smokeless tobacco: Never Used   Substance Use Topics    Alcohol use: Yes     Comment: BOTTLE OF LIQOUR DAILY    Drug use: Yes     Types: Marijuana     Comment: occassional        Past Surgical History:   Procedure Laterality Date    TUBAL LIGATION          No Known Allergies     Family History   Problem Relation Age of Onset    No Known Problems Mother     No Known Problems Father     Seizures Other     Asthma Other     Breast Cancer Other         Patient's past medical history, surgical history, family history, medications, and allergies  were all reviewed and updated as appropriate today. Review of Systems   Constitutional: Negative for fever. Respiratory: Negative for cough and shortness of breath. Cardiovascular: Negative for chest pain. Musculoskeletal: Negative for arthralgias, gait problem and myalgias. Psychiatric/Behavioral: Negative for dysphoric mood. /87 (Cuff Size: Medium Adult)   Pulse 77   Temp 97.4 °F (36.3 °C) (Temporal)   Ht 5' 6.5\" (1.689 m)   Wt 132 lb 9.6 oz (60.1 kg)   LMP 08/10/2021   SpO2 99% Comment: room air  Breastfeeding No   BMI 21.08 kg/m²      Physical Exam  Vitals reviewed. Constitutional:       Appearance: Normal appearance. She is not ill-appearing. Cardiovascular:      Rate and Rhythm: Normal rate and regular rhythm. Heart sounds: No murmur heard. Pulmonary:      Effort: Pulmonary effort is normal.      Breath sounds: Normal breath sounds. Musculoskeletal:      Comments: Right lateral thigh with small area of hyperpigmented macular discoloration, mild, small ~ 2-3 cm    Left lateral thigh with similar area of hyperpigmentation but much larger/more discolored than the right, associated indentation deformity appreciated, TTP, small open area with scabbing, no induration/flutuance/drainage/increased warmth   Skin:     Capillary Refill: Capillary refill takes less than 2 seconds. Neurological:      General: No focal deficit present. Mental Status: She is alert. Psychiatric:         Mood and Affect: Mood normal.                      Assessment:  Encounter Diagnoses   Name Primary?     Skin lesion of left leg Yes    Acquired deformity of left thigh Plan:    - Patient has somewhat unusual lesion on left lateral leg with similar spot on right leg but to much milder degree. Has been worsening for 1.5 years. Treated in ER for cellulitis with no improvement. Unclear etiology but I am concerned re: deformity of the soft tissue/indentation and deeper pathology. Recommended CT femur for further eval. She is aware to schedule this ASAP. I have placed derm referral as well and she was scheduled for 11/30. May need skin biopsy if CT scan normal.     New Prescriptions    No medications on file        Orders Placed This Encounter   Procedures   Royal Maryanne MD, Dermatology, [facility]        Return if symptoms worsen or fail to improve.         [address], DO

## 2021-08-18 ASSESSMENT — ENCOUNTER SYMPTOMS
SHORTNESS OF BREATH: 0
COUGH: 0

## 2021-08-23 ENCOUNTER — OFFICE VISIT (OUTPATIENT)
Dept: DERMATOLOGY | Age: 41
End: 2021-08-23
Payer: COMMERCIAL

## 2021-08-23 VITALS — TEMPERATURE: 98.1 F

## 2021-08-23 DIAGNOSIS — R22.40 SUBCUTANEOUS NODULE OF LOWER EXTREMITY: Primary | ICD-10-CM

## 2021-08-23 DIAGNOSIS — L82.1 DERMATOSIS PAPULOSA NIGRA: ICD-10-CM

## 2021-08-23 PROCEDURE — G8427 DOCREV CUR MEDS BY ELIG CLIN: HCPCS | Performed by: DERMATOLOGY

## 2021-08-23 PROCEDURE — G8420 CALC BMI NORM PARAMETERS: HCPCS | Performed by: DERMATOLOGY

## 2021-08-23 PROCEDURE — 4004F PT TOBACCO SCREEN RCVD TLK: CPT | Performed by: DERMATOLOGY

## 2021-08-23 PROCEDURE — 99203 OFFICE O/P NEW LOW 30 MIN: CPT | Performed by: DERMATOLOGY

## 2021-08-23 NOTE — Clinical Note
Dear Dr. Zuhair Jett,    I had the pleasure of seeing your patient, Mrs Barry Serna , yesterday for a dermatologic evaluation. Following my evaluation, my first impression is panniculitis, possible lupus profundus, however other panniculitides are also on the differential. I recommended we perform incisional biopsy and CTD work-up, however patient declined and wanted to have her CT scan completed before pursuing any of my suggested workup. We will follow up with her once the CT scan is completed. Please don't hesitate to contact me directly with any questions or concerns regarding this patient, or any other future patient needs.     Kind regards,  Krissy Shukla

## 2021-08-23 NOTE — PROGRESS NOTES
Patient's Name: Main Mchugh  MRN: <[id number]>  YOB: 1980  Date of Visit: 8/23/2021  Primary Care Provider: Ulisses Zayas DO  Referring Provider: Keturah Klinefelter Caroli*    Subjective:     Chief Complaint   Patient presents with    New Patient     lesion of leg left       History of Present Illness:  Mani Mchugh an 41 y.o. female who presents in clinic today as a new patient seen in consultation request by Ulisses Zayas DO for a  skin examination and evaluation of lesions on legs. Patient reports developing the lesions on both thighs ~ 1.5 years ago. They started as discoloration, then it started spreading, red, painful Lt worse then Rt. She believes she traumatized the Lt lesion, which caused it to open as a sore. The lesions are very painful, tender and warm to touch. She denies any other rashes, photosensitivity, hair loss, fevers, weight loss, joint pain, headaches, SOB, Raynaud's. When asked, patient denied exposure to cold or application of ice, horse riding or injecting any material.    Past Dermatologic History:  Personal history of non melanoma skin cancer: No   Personal history of melanoma: No  Personal history of abnormal/dysplastic moles: No  Personal history of tanning bed use current: No  Personal history of tanning bed use: No  Personal history of blistering sunburns: No  Personal history of extensive sun exposure: No  Burns easily: No  Practicing sun protective habits:  No     Social History:     Marital status:   Smoking Status: Current every day smoker  Children: Yes 2 Sons  2 Daughters    Type of outdoor activities if any : playing ball with grandsons      Family Skin Disease History:  Patient denies  a family history of non melanoma skin cancer. Patient denies  a family history of abnormal moles  Patient denies  an immediate family history of melanoma.      Past Medical History:  Past Medical History:   Diagnosis Date    Depression        Past Surgical History:  Past Surgical History:   Procedure Laterality Date    TUBAL LIGATION         Past Family History:  Family History   Problem Relation Age of Onset    No Known Problems Mother     No Known Problems Father     Seizures Other     Asthma Other     Breast Cancer Other        Allergies:  No Known Allergies    Current Medications:  Current Outpatient Medications   Medication Sig Dispense Refill    mirtazapine (REMERON) 15 MG tablet Take 15 mg by mouth nightly      DULoxetine (CYMBALTA) 20 MG extended release capsule Take 20 mg by mouth daily      ibuprofen (ADVIL;MOTRIN) 600 MG tablet Take 1 tablet by mouth every 6 hours as needed for Pain 30 tablet 0    naproxen (NAPROSYN) 500 MG tablet Take 1 tablet by mouth 2 times daily as needed for Pain 40 tablet 0     No current facility-administered medications for this visit. Review of Systems:  Constitutional: No fevers, chills or recent illness.    Skin: Skin:As per HPI AND otherwise no new, bleeding or symptomatic skin lesions      Objective:     Vitals:    08/23/21 1121   Temp: 98.1 °F (36.7 °C)     Physical Examination:  General: alert, comfortable, no apparent distress, well-appearing  Psych: alert, oriented and pleasant  Neuro: oriented to person, place, and time  Skin: Areas examined: head including face, lips, conjunctiva and lids, neck, hair/scalp, chest, including breasts and axilla, abdomen, back, buttocks, right upper extremity, left upper extremity, right lower extremity, left lower extremity, left hand, right hand and digits and nails      All areas examined were within normal limits except those listed below with the appropriate assessment and plan    Assessment and Plan (with relevant objective exam findings):     1. Subcutaneous nodules of lower extremities  Location/objective findings: Lt thigh with indurated violaceous/ reddish brown nodules subcutaneous nodules and plaques with peau d'orange texture, atrophy and surrounding erythema. Centrally there is an erosion with overlying hemorrhagic/yellowish crust  Rt lateral thigh with erythema and indurated subcutaneous nodules, violaceous atrophic plaque and surrounding erythema. Both lesions are tender to palpation   I had a very lengthy discussion with the patient regarding the differential diagnosis. I explained that this clinically appears to be panniculitis, either lupus panniculitis (lupus profundus) although not a typical location, alpha-1 antitrypsin, infectious panniculitis, SPTCL versus traumatic panniculitis. I did discuss the need for incisional biopsy and further lab work-up, however patient declined and wanted to have CT scan completed before pursuing biopsy. I did explain to patient that CT scan would show inflammation, however a biopsy would be needed to determine which of the panniculitis this belongs to and to determine best treatment course. Patient will have CT in 2 days and will call to schedule follow up visit. 2. Dermatosis Papulosa Nigra  Location: face  Objective Findings: scattered small brown to black stuck on papules    Discussed that these growths are benign and no further treatment necessary      Follow up:  Return visit ASAP or as needed for change in condition. All questions addressed. Procedure:   No procedure performed                  I saw your patient Matthew Heimlich at the date listed above in my Dermatology  clinic in [facility]. Thank you very much for the referral.    My exam findings, assessment and plan can be found in EPIC, I have also attached them below for your convenience. I very much appreciate your referral and the opportunity to participate in this patient's care. Please don't hesitate to contact me with questions or concerns about our visit or management of this patient in the future.      Annemarie Wallace MD, MS

## 2021-08-23 NOTE — LETTER
[facility]  [address] [place] [address]  Phone: [phone]  Fax: [phone]           Brissa Duarte MD      August 24, 2021     Patient: Mani Mchugh   MR Number: <[id number]>   YOB: 1980   Date of Visit: 8/23/2021       Dear Dr. Andrew Santizo: Thank you for referring Mani Mchugh to me for evaluation/treatment. Below are the relevant portions of my assessment and plan of care. If you have questions, please do not hesitate to call me. I look forward to following Angus Jain along with you.     Sincerely,        Brissa Duarte MD    CC providers:  Willard Meehan [place]

## 2021-08-24 PROBLEM — L82.1 DERMATOSIS PAPULOSA NIGRA: Status: ACTIVE | Noted: 2021-08-24

## 2021-08-25 ENCOUNTER — HOSPITAL ENCOUNTER (OUTPATIENT)
Dept: CT IMAGING | Age: 41
Discharge: HOME | End: 2021-08-25
Payer: COMMERCIAL

## 2021-08-25 DIAGNOSIS — M21.952 ACQUIRED DEFORMITY OF LEFT THIGH: ICD-10-CM

## 2021-08-25 DIAGNOSIS — L98.9 SKIN LESION OF LEFT LEG: ICD-10-CM

## 2021-08-25 PROCEDURE — 73701 CT LOWER EXTREMITY W/DYE: CPT

## 2021-08-25 PROCEDURE — 6360000004 HC RX CONTRAST MEDICATION: Performed by: FAMILY MEDICINE

## 2021-08-25 RX ADMIN — IOPAMIDOL 80 ML: 755 INJECTION, SOLUTION INTRAVENOUS at 11:38

## 2021-09-01 ENCOUNTER — TELEPHONE (OUTPATIENT)
Dept: DERMATOLOGY | Age: 41
End: 2021-09-01

## 2021-09-09 ENCOUNTER — OFFICE VISIT (OUTPATIENT)
Dept: DERMATOLOGY | Age: 41
End: 2021-09-09
Payer: COMMERCIAL

## 2021-09-09 DIAGNOSIS — R21 RASH AND NONSPECIFIC SKIN ERUPTION: ICD-10-CM

## 2021-09-09 DIAGNOSIS — R22.9 SUBCUTANEOUS NODULE: Primary | ICD-10-CM

## 2021-09-09 PROCEDURE — 99214 OFFICE O/P EST MOD 30 MIN: CPT | Performed by: DERMATOLOGY

## 2021-09-09 PROCEDURE — G8420 CALC BMI NORM PARAMETERS: HCPCS | Performed by: DERMATOLOGY

## 2021-09-09 PROCEDURE — 4004F PT TOBACCO SCREEN RCVD TLK: CPT | Performed by: DERMATOLOGY

## 2021-09-09 PROCEDURE — G8427 DOCREV CUR MEDS BY ELIG CLIN: HCPCS | Performed by: DERMATOLOGY

## 2021-09-09 NOTE — PROGRESS NOTES
Patient's Name: Stephan Park  MRN: <[id number]>  YOB: 1980  Date of Visit: 9/9/2021  Primary Care Provider: Meg Whitten DO  Referring Provider: No ref. provider found    Subjective:   Chief Complaint:   Follow-up (painful lesions)      History of Present Illness:   Stephan Park is a 41 y.o. female who presents in clinic today for evaluation and consideration of biopsy of her thigh. Last office visit: 8/23/21    At their last visit patient was reluctant to have a biopsy and wanted to wait until she has her CT scan done. She reports her thighs are very painful and ibuprofen is not helping. She reports her daughter reminded her that she had a lesion on her scalp that was biopsied at  several years ago, however never went back for results. She denies any other lesions on her skin except for the thigh lesions. She again denies injections, cold application or horse riding. No headaches, raynaud's, SOB. She does endorse joint pains in her hands. On chart review it was noted that she has been seeing [facility] orthopedics. Past medical and surgical histories, current medications, allergies, social and family histories were reviewed with no change since the last visit        Allergies:  No Known Allergies    Current Medications:  Current Outpatient Medications   Medication Sig Dispense Refill    mirtazapine (REMERON) 15 MG tablet Take 15 mg by mouth nightly      DULoxetine (CYMBALTA) 20 MG extended release capsule Take 20 mg by mouth daily      ibuprofen (ADVIL;MOTRIN) 600 MG tablet Take 1 tablet by mouth every 6 hours as needed for Pain 30 tablet 0    naproxen (NAPROSYN) 500 MG tablet Take 1 tablet by mouth 2 times daily as needed for Pain 40 tablet 0     No current facility-administered medications for this visit. Review of Systems:  Constitutional: No fevers, chills or recent illness.  fatigue  Skin: Skin:As per HPI AND otherwise no new, bleeding or symptomatic skin lesions      Objective: There were no vitals filed for this visit. CT scan results:        1.  About 10 cm area of skin and subcutaneous space infiltration in the medial left thigh is nonspecific and can be seen with infectious, inflammatory, traumatic, or uncommonly neoplastic/lymphoproliferative etiologies. Correlate with clinical findings    and consider histologic sampling as needed. 2.  Mild left inguinal lymphadenopathy, also nonspecific. Physical Examination:  General: alert, very anxious, and tearful  Psych: alert, oriented and pleasant  Neuro: oriented to person, place, and time  Skin: Areas examined: head including face, lips, conjunctiva and lids, neck, hair/scalp, right lower extremity, left lower extremity, left hand, right hand and digits and nails      All areas examined were within normal limits except those listed below with the appropriate assessment and plan    Assessment and Plan (with relevant objective exam findings):     1. Subcutaneous nodules unchanged from previous visit  Had a very lengthy discussion regarding the need for a biopsy. Patient was tearful and declined at this time. She would like me to find out if this can be done while she is sedated. I explained that it is imperative to have the biopsy performed to be able to reach a diagnosis and treat the underlying condition. I will contact general surgery to check if this is a possibility to have an incisional biopsy under sedation. I also explained that we will have to order blood work for CTD. She verbalized understanding. CMP  C3,C4  JORGE reflex to antibody cascade  Lupus anticoagulant  Cardiolipin antibody IGA, IGG and IGM  B2 glycoprotein  DS DNA  JORGE with Hep-2 IgG by IFA  G6PD    I spent 30 minutes     1. Preparing to see the patient ( reviewing records and tests)  2. Performing a medically appropriate examination and evaluation  3.  Counseling and educating the patient/family caregiver  4. Ordering medications, tests, or procedures  5. Referring and communicating with other healthcare professionals  6. Documenting clinical information in the electronic or other health record  7. Independently interpreting results and communicating results to the patient /family/caregiver  8. Care coordination        Follow up:  Return visit soon or as needed for change in condition. All questions addressed.      Procedure:   No procedure performed          Roise Goldberg, MD. MS

## 2021-09-10 ENCOUNTER — TELEPHONE (OUTPATIENT)
Dept: DERMATOLOGY | Age: 41
End: 2021-09-10

## 2021-09-13 ENCOUNTER — TELEPHONE (OUTPATIENT)
Dept: DERMATOLOGY | Age: 41
End: 2021-09-13

## 2021-09-23 ENCOUNTER — OFFICE VISIT (OUTPATIENT)
Dept: DERMATOLOGY | Age: 41
End: 2021-09-23
Payer: COMMERCIAL

## 2021-09-23 VITALS — TEMPERATURE: 96.6 F

## 2021-09-23 DIAGNOSIS — R21 RASH AND NONSPECIFIC SKIN ERUPTION: Primary | ICD-10-CM

## 2021-09-23 PROCEDURE — G8427 DOCREV CUR MEDS BY ELIG CLIN: HCPCS | Performed by: DERMATOLOGY

## 2021-09-23 PROCEDURE — G8420 CALC BMI NORM PARAMETERS: HCPCS | Performed by: DERMATOLOGY

## 2021-09-23 PROCEDURE — 11104 PUNCH BX SKIN SINGLE LESION: CPT | Performed by: DERMATOLOGY

## 2021-09-23 PROCEDURE — 99214 OFFICE O/P EST MOD 30 MIN: CPT | Performed by: DERMATOLOGY

## 2021-09-23 PROCEDURE — 4004F PT TOBACCO SCREEN RCVD TLK: CPT | Performed by: DERMATOLOGY

## 2021-09-23 RX ORDER — CEPHALEXIN 500 MG/1
500 CAPSULE ORAL 4 TIMES DAILY
Qty: 28 CAPSULE | Refills: 0 | Status: SHIPPED | OUTPATIENT
Start: 2021-09-23 | End: 2021-09-30

## 2021-09-23 RX ORDER — CLOBETASOL PROPIONATE 0.5 MG/G
OINTMENT TOPICAL
Qty: 60 G | Refills: 2 | Status: SHIPPED | OUTPATIENT
Start: 2021-09-23 | End: 2022-04-13 | Stop reason: SDUPTHER

## 2021-09-23 NOTE — PROGRESS NOTES
BUN 8 7 - 20 mg/dL    CREATININE 0.7 0.6 - 1.1 mg/dL    GFR Non-African American >60 >60    GFR African American >60 >60    Calcium 9.5 8.3 - 10.6 mg/dL    Total Protein 8.2 6.4 - 8.2 g/dL    Albumin 4.4 3.4 - 5.0 g/dL    Albumin/Globulin Ratio 1.2 1.1 - 2.2    Total Bilirubin 0.4 0.0 - 1.0 mg/dL    Alkaline Phosphatase 88 40 - 129 U/L    ALT 7 (L) 10 - 40 U/L    AST 15 15 - 37 U/L    Globulin 3.8 g/dL   C4 COMPLEMENT   Result Value Ref Range    C4 Complement 9.3 (L) 10.0 - 40.0 mg/dL   C3 COMPLEMENT   Result Value Ref Range    C3 Complement 111.0 90.0 - 180.0 mg/dL   B-2 GLYCOPROTEIN (IGA)   Result Value Ref Range    Beta-2 Glyco 1 IgA <10 0 - 20 ZACH   CARDIOLIPIN ANTIBODY, IGA   Result Value Ref Range    Anticardiolipin IgA <10 0 - 11 APL   LUPUS ANTICOAGULANT   Result Value Ref Range    PTT D 39 32 - 48 sec    PT D 11.9 (L) 12.0 - 15.5 sec    Thrombin Time Not Applicable 39.4 - 90.9 sec    Reptilase Tm Not Applicable <=93.6 sec    Ptt-D Kim Reflex Not Applicable 32 - 48 sec    dRVVT Screen 39 33 - 44 sec    DRVVT,DIL Not Applicable 33 - 44 sec    Lup Anticoag Interp See Note     Hex Phosph Neut Test Not Applicable Negative    Ptt-Heparin Neutralized Not Applicable 32 - 48 sec    PLT NEUTA Not Applicable Negative    DRVVT Confirmation Test Not Applicable Negative ratio   JORGE Reflex to Antibody Cascade   Result Value Ref Range    JORGE POSITIVE (A) Negative   ANTI-DNA ANTIBODY, DOUBLE-STRANDED   Result Value Ref Range    Anti-dsDNA IgG 5 0 - 9 IU/mL   JORGE with HEp-2 IgG by IFA   Result Value Ref Range    Antinuclear Antibody, Hep-2, IGG Detected (H) <1:80    Antinuclear AB Interpretive Comment See Note    JORGE Antibody Cascade   Result Value Ref Range    Anti-Centromere B IgG <0.2 0.0 - 0.9 AI    Anti-Chromatin IgG <0.2 0.0 - 0.9 AI    Anti-JO1 IgG <0.2 0.0 - 0.9 AI    Anti-Ribosomal P IgG <0.2 0.0 - 0.9 AI    Anti-RNP IgG 0.3 0.0 - 0.9 AI    Anti-Smith IgG 0.2 0.0 - 0.9 AI    Anti-SmRNP IgG <0.2 0.0 - 0.9 AI Anti-dsDNA IgG 5 0 - 9 IU/mL    Anti-SCL70 IgG <0.2 0.0 - 0.9 AI    Anti-SS-A IgG 6.8 (H) 0.0 - 0.9 AI    Anti-SS-B IgG <0.2 0.0 - 0.9 AI   Glucose 6 Phosphate Dehydrogenase   Result Value Ref Range    G-6-PD, Quant 12.2 9.9 - 16.6 U/g Hb   Antinuclear AB, Single Pattern   Result Value Ref Range    JORGE Pattern Nuclear Dot (A)     JORGE TITER 1:1280 (A)    Cytoplasmic Pattern   Result Value Ref Range    Cytoplasmic Pattern Titer 1:320 (A)          Physical Examination:  General: alert, comfortable, no apparent distress, well-appearing  Psych: alert, oriented and pleasant and anxious  Neuro: oriented to person, place, and time  Skin: Areas examined: head including face, lips, conjunctiva and lids, neck, hair/scalp, abdomen, right lower extremity, left lower extremity, left hand, right hand and digits and nails      All areas examined were within normal limits except those listed below with the appropriate assessment and plan    Assessment and Plan (with relevant objective exam findings):     1. Rash and non-specific skin eruption  Location:Rt and Lt lateral thighs with indurated violaceous/brown plaques and peau d'orange texture and appearance. Plaque on Lt thigh with erosion and crust     Decided on incisional biopsy today. See procedure note below. The specimen was sent to pathology for diagnosis. We will call patient or send note on Mychart with biopsy results as soon as they are available, and discuss treatment options as needed. Wound care was discussed and written instructions also given to patient. - Keflex 500 mg QID x 7 days  - Mupirocin to biopsy site twice daily x 7 days  - Clobetasol 0.05% ointment to areas on thighs twice daily    I reviewed the labs with the patient and discussed the positive JORGE and Anti-Ro. Awaiting other results. Follow up:  Return visit in 2 weeks or as needed for change in condition. All questions addressed.      Procedure:   PROCEDURE: INCISIONAL BIOPSY (10740)  Location: Left lateral thigh  Indication: Rash and non specific skin eruption     Verbal and written informed consent was obtained. The area was photographed with the patient's permission. The area was anesthetized using 1% lidocaine with epinephrine. The area was prepped and draped in sterile fashion and an incision via 6 mm punch was made to the level of deep subcutaneous fat. Used 4 mm punch for tunneling and removal of more subcutaneous fat. The specimen was removed and placed in an appropriately labeled container. Hemostasis was achieved with direct pressure and/or cautery. Layered closure with 3-0 deep vicryl sutures and 3.0 interrupted prolene sutures. Vaseline ointment and sterile dressing were applied and wound care instructions were given verbally/in writing. The patient tolerated the procedure well. The patient will be notified by mail or telephone of pathology results and is instructed to call us if they have not received notification within three weeks.    -[facility] re: wound care, suture removal    Pt left office in stable condition.        Horacio Hamlin MD. MS

## 2021-09-29 LAB — DERMATOLOGY PATHOLOGY REPORT: NORMAL

## 2021-10-06 ENCOUNTER — OFFICE VISIT (OUTPATIENT)
Dept: DERMATOLOGY | Age: 41
End: 2021-10-06
Payer: COMMERCIAL

## 2021-10-06 VITALS — TEMPERATURE: 96.6 F

## 2021-10-06 DIAGNOSIS — L93.2 LUPUS PANNICULITIS: Primary | ICD-10-CM

## 2021-10-06 PROCEDURE — G8427 DOCREV CUR MEDS BY ELIG CLIN: HCPCS | Performed by: DERMATOLOGY

## 2021-10-06 PROCEDURE — G8420 CALC BMI NORM PARAMETERS: HCPCS | Performed by: DERMATOLOGY

## 2021-10-06 PROCEDURE — G8484 FLU IMMUNIZE NO ADMIN: HCPCS | Performed by: DERMATOLOGY

## 2021-10-06 PROCEDURE — 4004F PT TOBACCO SCREEN RCVD TLK: CPT | Performed by: DERMATOLOGY

## 2021-10-06 PROCEDURE — 99214 OFFICE O/P EST MOD 30 MIN: CPT | Performed by: DERMATOLOGY

## 2021-10-06 NOTE — Clinical Note
Wanted to let you know that the biopsy came back as lupus panniculitis (lupus profundus) as I suspected. I had a very lengthy discussion with the patient regarding treatment with Plaquenil. She is very hesitant and I gave her the option to discuss with family. I will let you know once treatment plan is determined.     Kind regards,  Karin Hernandez

## 2021-10-06 NOTE — PROGRESS NOTES
Patient's Name: Susy Shaffer  MRN: <[id number]>  YOB: 1980  Date of Visit: 10/6/2021  Primary Care Provider: [address], DO  Referring Provider: No ref. provider found    Subjective:     Chief Complaint   Patient presents with    Follow-up     rash slightly better    Suture / Staple Removal        History of Present Illness:  Susy Shaffer a 41 y.o. female is a follow up patient to my practice. They were last seen in clinic on 9/23/21     At her last visit she had a biopsy revealing lupus profundus. Since her last visit, patient has been using clobetasol ointment on the inflamed areas on her thighs. She reports improvement. Continues to have pain. She denies headaches, Raynaud's, muscle pain or weakness, joint pain/swelling, hair loss, other skin lesions, or difficulty breathing. Past medical/surgical/family history reviewed with no changes since last visit on 9/23/21        Allergies:  No Known Allergies    Current Medications:  Current Outpatient Medications   Medication Sig Dispense Refill    clobetasol (TEMOVATE) 0.05 % ointment Apply to affected areas twice daily 60 g 2    mupirocin (BACTROBAN) 2 % ointment Apply to affected area BID for 7 days 22 g 1    mirtazapine (REMERON) 15 MG tablet Take 15 mg by mouth nightly      DULoxetine (CYMBALTA) 20 MG extended release capsule Take 20 mg by mouth daily      ibuprofen (ADVIL;MOTRIN) 600 MG tablet Take 1 tablet by mouth every 6 hours as needed for Pain 30 tablet 0    naproxen (NAPROSYN) 500 MG tablet Take 1 tablet by mouth 2 times daily as needed for Pain 40 tablet 0     No current facility-administered medications for this visit. Review of Systems:  Constitutional: No fevers, chills or recent illness.    Skin: Skin:As per HPI AND otherwise no new, bleeding or symptomatic skin lesions      Objective:     Vitals:    10/06/21 0936   Temp: 96.6 °F (35.9 °C)       PATHOLOGY REPORT:    DIAGNOSIS: LEFT LATERAL THIGH-     Consistent with lupus profundus   RESULTS Malena Dorman MD   Electronic Signature: 29 SEP 2021 09:20 AM     MICROSCOPIC DESCRIPTION   Multiple fragments are present but will be described as   one. The epidermis is unremarkable. There is a superficial   and deep, perivascular and periappendageal, lymphocytic   infiltrate with plasma cells that extends into the   subcutaneous fat as lymphoplasmacytic nodular aggregates. There is dermal fibrosis that extends into and widens the   subcutaneous fat septae. Deep mucin also is observed. The   findings are consistent with lupus profundus. Representative sections have been reviewed by one or more   pathologists as an intradepartmental consultation. Physical Examination:  General: alert, comfortable, no apparent distress, well-appearing  Psych: alert, oriented and pleasant  Neuro: oriented to person, place, and time  Skin: Areas examined: head including face, lips, conjunctiva and lids, neck, hair/scalp, right lower extremity, left lower extremity, left hand, right hand and digits and nails      All areas examined were within normal limits except those listed below with the appropriate assessment and plan    Assessment and Plan (with relevant objective exam findings):     1. Lupus panniculitis (profundus)  Location/objective findings: No ulceration noted today. Otherwise, exam unchanged from previous visit. Discussed biopsy results at length with the patient. I explained that this is a form of cutaneous lupus, however 1/3 of patients with this form may also have discoid lupus, and SLE can be present in 10% of patients with this form of cutaneous lupus. At this she has no labs or signs and symptoms indicating SLE. She would however need to be monitored for development of any of the signs or symptoms in the future and lab work accordingly. We discussed treatment is with hydroxychloroquine.  ILK would not be an effective method in her type of cutaneous lupus. Reviewed side effects of medications such as headaches, GI upset, rare risk of blood count abnormalities and transaminitis as well as potential for retinopathy with long-term use of medication. Recommended that patient see ophthalmology for a baseline eye exam with in the next 3 months for monitoring while on hydroxychloroquine. Patient is hesitant to commit to treatment at this time. She would like to discuss with family first. Patient was provided with written information regarding the diagnosis and treatment options. I spent 36 minutes face-to face with the patient today, greater than 50% of which was spent in counseling and/or coordination of care. Follow up:  Return visit will be determined based on her decision regarding treatment or as needed for change in condition. All questions addressed.      Procedure:   No procedure performed          Marcela Diaz MD. MS

## 2021-10-06 NOTE — LETTER
Dermatology  [address] [place] [address]  Phone: [phone]  Fax: [phone]    Royce Mendoza MD    October 12, 2021     [address], [address]    Patient: Ale Miller   MR Number: <[id number]>   YOB: 1980   Date of Visit: 10/6/2021       Dear [address]: Thank you for referring Ale Miller to me for evaluation/treatment. Below are the relevant portions of my assessment and plan of care. If you have questions, please do not hesitate to call me. I look forward to following Perri Wheelers along with you.     Sincerely,      Royce Mendoza MD

## 2021-10-12 ENCOUNTER — TELEPHONE (OUTPATIENT)
Dept: DERMATOLOGY | Age: 41
End: 2021-10-12

## 2021-10-12 NOTE — TELEPHONE ENCOUNTER
Dr Denny Ackerman patient  Pt lvm c/b #[phone]  Pt stated  Calling to see when does she come back into office regarding her treatment for her diagnosis and what are the next steps for her care.   Please c/b to discuss

## 2021-10-13 NOTE — TELEPHONE ENCOUNTER
Called patient. Patient did not answer. Left message for patient to return call. Patient needs to be scheduled for next week sometime.

## 2021-10-19 NOTE — TELEPHONE ENCOUNTER
Called and spoke with patient. Patient would like to ask  some questions regarding the medication. She would like to know if this is the only treatment as far as for the medication. She would like to start some form of treatment. Advised patient that she needs to be scheduled. Patient is scheduled for 10/22 at 12:20. Closing encounter.

## 2021-10-22 ENCOUNTER — VIRTUAL VISIT (OUTPATIENT)
Dept: DERMATOLOGY | Age: 41
End: 2021-10-22
Payer: COMMERCIAL

## 2021-10-22 DIAGNOSIS — L93.2 LUPUS PANNICULITIS: Primary | ICD-10-CM

## 2021-10-22 PROCEDURE — G8427 DOCREV CUR MEDS BY ELIG CLIN: HCPCS | Performed by: DERMATOLOGY

## 2021-10-22 PROCEDURE — 4004F PT TOBACCO SCREEN RCVD TLK: CPT | Performed by: DERMATOLOGY

## 2021-10-22 PROCEDURE — G8420 CALC BMI NORM PARAMETERS: HCPCS | Performed by: DERMATOLOGY

## 2021-10-22 PROCEDURE — G8484 FLU IMMUNIZE NO ADMIN: HCPCS | Performed by: DERMATOLOGY

## 2021-10-22 PROCEDURE — 99214 OFFICE O/P EST MOD 30 MIN: CPT | Performed by: DERMATOLOGY

## 2021-10-22 RX ORDER — HYDROXYCHLOROQUINE SULFATE 200 MG/1
TABLET, FILM COATED ORAL
Qty: 50 TABLET | Refills: 3 | Status: SHIPPED | OUTPATIENT
Start: 2021-10-22 | End: 2022-04-13 | Stop reason: SDUPTHER

## 2021-10-22 NOTE — PROGRESS NOTES
TELEHEALTH EVALUATION -- Audio/Visual (During [id number]-[id number] public health emergency)    I have explained to Ms. Sindy Marques  that a Physical Examination is inherently limited by the nature of telemedicine and that this may lead to delayed or inadequate diagnosis. I also explained that she may require a higher level of care if a diagnosis can not be reasonably established with a virtual visit. Ms. Sindy Marques  has provided her Consent to proceed with a Virtual Visit today. Ms. Sindy Marques   was personally present on the video link with me today. This visit was completed virtually using the [employer] platform. Due to this being a TeleHealth encounter, evaluation of the following organ systems is limited: Vitals/Constitutional/EENT/Resp/CV/GI//MS/Neuro/Skin/Heme-Lymph-Imm. Patient's Name: Sindy Marqeus  MRN: <[id number]>  YOB: 1980  Date of Visit: 10/22/2021  Primary Care Provider: Lance Goddard DO  Referring Provider: No ref. provider found    Subjective:     Chief Complaint   Patient presents with    Follow-up     Virtual visit- to discuss medication for lupus        History of Present Illness:  Sindy Marques a 41 y.o. female is a follow up patient to my practice. They were last seen in clinic on 10/6/21       At her last visit, patient was reluctant to start treatment with plaquenil and wanted more time to decide. She reports discussing with family and reading about it. She would like to discuss side effects and has questions about the medication. She reports pain in her knee last night. Denies any swelling, or change to her wounds on her thighs. Pain is 10/10.       Past medical/surgical/family history reviewed with no changes since last visit on 10/6/21        Allergies:  No Known Allergies    Current Medications:  Current Outpatient Medications   Medication Sig Dispense Refill    hydroxychloroquine (PLAQUENIL) Will start 200 mg po once daily M,T,W and Thur and 400 mg Fri, Sat, Sun (average of 5 mg/kg/day). Recommended that patient see ophthalmology for a baseline eye exam within the next 3 months for monitoring while on hydroxychloroquine. Total time (pre, during, after visit on date of service): 30 minutes was spent with the patient discussing dx, findings/test results, reviewing chart, tx options, side effects, patient progress, follow up, coordination of care, and documentation. Follow up:  Return visit in 4 weeks or as needed for change in condition. All questions addressed. Procedure:   No procedure performed          Son Garcia MD. MS  Pursuant to the emergency declaration under the [facility], 1135 waiver authority and the Coronavirus Preparedness and [employer] Act, this Virtual  Visit was conducted, with patient's consent, to reduce the patient's risk of exposure to COVID-19 and provide continuity of care for an established patient. Services were provided through a video synchronous discussion virtually to substitute for in-person clinic visit.

## 2021-11-04 ENCOUNTER — TELEPHONE (OUTPATIENT)
Dept: DERMATOLOGY | Age: 41
End: 2021-11-04

## 2021-11-04 NOTE — TELEPHONE ENCOUNTER
Called and spoke with patient. Advised patient she needed to be scheduled for a 4 week follow up from her last visit. Advised patient that I would be calling her back to confirm a date.

## 2021-11-04 NOTE — TELEPHONE ENCOUNTER
----- Message from Tanya Mota MD sent at 11/3/2021 10:48 PM EDT -----  Regarding: Please schedule this patient 4 weeks from her last visit

## 2021-11-08 DIAGNOSIS — G62.9 NEUROPATHY: ICD-10-CM

## 2021-11-08 DIAGNOSIS — F32.A DEPRESSION, UNSPECIFIED DEPRESSION TYPE: Primary | ICD-10-CM

## 2021-11-08 RX ORDER — DULOXETIN HYDROCHLORIDE 20 MG/1
20 CAPSULE, DELAYED RELEASE ORAL DAILY
Qty: 90 CAPSULE | Refills: 1 | Status: SHIPPED | OUTPATIENT
Start: 2021-11-08 | End: 2022-08-01

## 2021-11-08 NOTE — PROGRESS NOTES
Spoke with patient on the phone. She is having pain in thigh- seeing derm for this issue. Recently started on Plaquenil for pain but has not significantly improved. However, she states she is not taking it twice a day on the days she is supposed to and usually only taking it once a day. She also has not been taking her Cymbalta or Remeron which were previously prescribed for depression. Recommended to take Plaquenil as prescribed and to restart Cymbalta as it can also help with nerve pain. Recommended she follow up with dermatology and will also discuss with PCP when she is back in the office later this week. Precautions given, questions answered. She is going to call derm to set up appt.

## 2021-11-08 NOTE — TELEPHONE ENCOUNTER
Pt is calling looking for an update when she can get scheduled. Pt is also requesting to be seen this week as her area that the biopsy was done at has stayed open after the removal of stiches and now has a hole, pt noticed it Sat morning. C/B# [phone]   Please advise, Thank you!

## 2021-11-10 ENCOUNTER — OFFICE VISIT (OUTPATIENT)
Dept: DERMATOLOGY | Age: 41
End: 2021-11-10
Payer: COMMERCIAL

## 2021-11-10 VITALS — TEMPERATURE: 98.4 F

## 2021-11-10 DIAGNOSIS — L93.2 LUPUS PANNICULITIS: Primary | ICD-10-CM

## 2021-11-10 PROCEDURE — 4004F PT TOBACCO SCREEN RCVD TLK: CPT | Performed by: DERMATOLOGY

## 2021-11-10 PROCEDURE — 99214 OFFICE O/P EST MOD 30 MIN: CPT | Performed by: DERMATOLOGY

## 2021-11-10 PROCEDURE — G8420 CALC BMI NORM PARAMETERS: HCPCS | Performed by: DERMATOLOGY

## 2021-11-10 PROCEDURE — G8484 FLU IMMUNIZE NO ADMIN: HCPCS | Performed by: DERMATOLOGY

## 2021-11-10 PROCEDURE — G8427 DOCREV CUR MEDS BY ELIG CLIN: HCPCS | Performed by: DERMATOLOGY

## 2021-11-10 RX ORDER — FAMOTIDINE 20 MG/1
20 TABLET, FILM COATED ORAL 2 TIMES DAILY
Qty: 60 TABLET | Refills: 3 | Status: SHIPPED | OUTPATIENT
Start: 2021-11-10 | End: 2022-08-01

## 2021-11-10 RX ORDER — PREDNISONE 10 MG/1
TABLET ORAL
Qty: 53 TABLET | Refills: 0 | Status: SHIPPED | OUTPATIENT
Start: 2021-11-10 | End: 2021-11-28

## 2021-11-10 NOTE — Clinical Note
Catarina Sam,    I saw Mrs. Jesse Beckwith in my clinic last week, while you were out of town. Her plaque on left thigh was inflamed and she was in a lot of pain. She was not taking her plaquenil as directed, which we discussed. I started her on prednisone taper while the plaquenil takes its time to work. I would appreciate it if you could help me with her pain management until we can get her disease under control.     Thanks,  [employer]

## 2021-11-10 NOTE — PROGRESS NOTES
Patient's Name: Emeline Lanes  MRN: <[id number]>  YOB: 1980  Date of Visit: 11/10/2021  Primary Care Provider: Mikel Irizarry DO  Referring Provider: No ref. provider found    Subjective:   Chief Complaint:   Lesion(s) (Area on left thigh painful and red)      History of Present Illness:   Emeline Lanes is a 35 y.o. female with lupus panniculitis who presents in clinic today for evaluation of the left thigh lesion. Last office visit: 10/6/21    At their last visit they were initiated on Hydroxychloroquine 200 mg once daily for 4 days, then 400 mg once daily x 3 days. Patient reports taking her plaquenil on weekdays and then forgetting to take 2 tabs on Fri, Sat and Sun. She reports most recently the area on the left thigh became painful to touch and red around. She reports the pain is icnreased from the past few weeks. She reports being unable to sleep as if she turns on that side or even if clothing rubs on it it becomes tender to touch       Past medical and surgical histories, current medications, allergies, social and family histories were reviewed with no change since the last visit        Allergies:  No Known Allergies    Current Medications:  Current Outpatient Medications   Medication Sig Dispense Refill    Naproxen Sodium (ALEVE PO) Take by mouth      predniSONE (DELTASONE) 10 MG tablet Take 6 tablets by mouth daily for 3 days, THEN 4 tablets daily for 5 days, THEN 2 tablets daily for 5 days, THEN 1 tablet daily for 5 days.  53 tablet 0    famotidine (PEPCID) 20 MG tablet Take 1 tablet by mouth 2 times daily 60 tablet 3    hydroxychloroquine (PLAQUENIL) 200 MG tablet Take 1 pill Monday, Tuesday, Wednesday and Thursday and 2 pills Friday, Saturday and Sunday 50 tablet 3    clobetasol (TEMOVATE) 0.05 % ointment Apply to affected areas twice daily 60 g 2    mupirocin (BACTROBAN) 2 % ointment Apply to affected area BID for 7 days 22 g 1    DULoxetine (CYMBALTA) 20 MG extended release capsule Take 1 capsule by mouth daily (Patient not taking: Reported on 11/10/2021) 90 capsule 1    mirtazapine (REMERON) 15 MG tablet Take 15 mg by mouth nightly (Patient not taking: Reported on 10/22/2021)      ibuprofen (ADVIL;MOTRIN) 600 MG tablet Take 1 tablet by mouth every 6 hours as needed for Pain (Patient not taking: Reported on 11/10/2021) 30 tablet 0    naproxen (NAPROSYN) 500 MG tablet Take 1 tablet by mouth 2 times daily as needed for Pain (Patient not taking: Reported on 11/10/2021) 40 tablet 0     No current facility-administered medications for this visit. Review of Systems:  Constitutional: No fevers, chills or recent illness. Skin: Skin:As per HPI AND otherwise no new, bleeding or symptomatic skin lesions      Objective:     Vitals:    11/10/21 1359   Temp: 98.4 °F (36.9 °C)   TempSrc: Infrared       Physical Examination:  General: alert, comfortable, no apparent distress, well-appearing  Psych: alert, oriented and pleasant  Neuro: oriented to person, place, and time  Skin: Areas examined: head including face, lips, conjunctiva and lids, neck, hair/scalp, right lower extremity, left lower extremity, left hand, right hand and digits and nails      All areas examined were within normal limits except those listed below with the appropriate assessment and plan    Assessment and Plan (with relevant objective exam findings):     1. Lupus panniculitis  Location/objective findings: Lt thigh with indurated violaceous/dark brown plaque with surrounding erythema and hyperkeratotic plaque within and small healing biopsy site. Rt lateral thigh with indurated plaque and faint erythema. Discussed the importance of compliance with the medication hydroxychloroquine as prescribed. We also discussed potential ILK, however patient already has atrophic plaques and I would be concerned with worsened atrophy/non healing wound.  Discussed prednisone as an option while while we give time for plaquenil to have an effect. Risks, benefits and alternatives were discussed and patient opted to start prednisone. Will initiate treatment with tapered course of prednisone (60 mg po daily x 3 days, then 40 mg po daily x 5 days, then 20 mg po daily x 5 days, then 10 mg po daily x 5 days). Discussed potential side effects of prednisone including insomnia, mood lability and GI upset. - Famotidine 20 mg twice daily  - Calcium and vit D daily  Patient is in a lot of pain and unable to perform daily functions, even walking causes pain. Will contact PCP for pain management    I spent 45 minutes face-to face with the patient today, greater than 50% of which was spent in counseling and/or coordination of care. Follow up:  Return visit in 2 weeks or as needed for change in condition. All questions addressed.      Procedure:   No procedure performed          Deidre Miller MD. MS

## 2021-11-16 NOTE — PATIENT INSTRUCTIONS
PREDNISONE     Your doctor has started you on prednisone. Unless you have a history of kidney stones or high blood calcium, you should start taking Calcium and vitamin D supplements to help protect your bones while you are taking prednisone. Calcium and vitamin D are over-the-counter, meaning no prescriptions are needed. Recommended doses:  Calcium 7235-4651 mg daily (1-1.5 grams) daily  Vitamin D 800-1000 IU daily    If you might be on prednisone for more than a few weeks, or, if you are at risk for weak bones, your doctor might also start a bisphosphonate medication. Names of this medication include Risedronate (Actonel) and Alendronate (Fosamax). These medications can be taken in weekly or daily dosing. When you take Risedronate or Alendronate, follow these instructions:  1)  Take medicine with water in the morning  2)  Take 30 minutes prior to taking any other medications or eating food, to maximize absorption of the medicine. 3)  Avoid lying down for 30 minutes after taking the medication. 4)  Do not take this medication if you are pregnant or thinking about becoming pregnant. Patient information for Prednisone    What do I need to tell my doctor before I take this drug?   If you have an allergy to prednisone or any other part of this drug.   If you are allergic to any drugs like this one, any other drugs, foods, or other substances. Tell your doctor about the allergy and what signs you had, like rash; hives; itching; shortness of breath; wheezing; cough; swelling of face, lips, tongue, or throat; or any other signs.  If you have a bad infection.  Tell your doctor and pharmacist about all of your drugs (prescription or OTC, natural products, vitamins) and health problems. You must check to make sure that it is safe for you to take this drug with all of your drugs and health problems. Do not start, stop, or change the dose of any drug without checking with your doctor.     What are some things I need to know or do while I take this drug?  Tell dentists, surgeons, and other doctors that you use this drug.  If you have been taking this drug for many weeks, talk with your doctor before stopping. You may want to slowly stop this drug (a prednisone taper or wean).  Do not run out of this drug. Tell your doctor if you are getting low.  Talk with your doctor before getting any vaccines. Use with this drug may either raise the chance of an infection or make the vaccine not work as well.  Avoid being near anyone with chickenpox or measles.  Do not take antacids within 2 hours of this drug.  If you have high blood sugar (diabetes), talk with your doctor. This drug may raise blood sugar.  Check your blood sugar as you have been told by your doctor.  If you have glaucoma or cataracts, talk with your doctor.  If you have high blood pressure, talk with your doctor.  If you are being treated for any infection, talk with your doctor.  If you have soft, brittle bones (osteoporosis), talk with your doctor.  If you have stomach ulcers, talk with your doctor.  If you have TB (tuberculosis), talk with your doctor.  If you have a weak heart, talk with your doctor.  Avoid any alcohol, including beer, wine, or mixed drinks. What are some side effects of this drug?  All drugs may cause side effects. However, many people have no side effects or only have minor side effects. Call your doctor if any of these side effects or any other side effects bother you.  High blood sugar. This most often goes back to normal when drug is stopped.  You may have more chance of getting an infection. Wash hands often. Stay away from people with infections, colds, or flu.  Belly pain.  Upset stomach or throwing up. Many small meals, good mouth care, sucking hard, sugar-free candy, or chewing sugar-free gum may help.  Weight gain.  Mood changes.  Change in body fat.    Weak bones with Do not change the dose or stop this drug.  You can always check with your doctor about instructions following a missed dose.

## 2021-11-17 ENCOUNTER — TELEPHONE (OUTPATIENT)
Dept: PRIMARY CARE CLINIC | Age: 41
End: 2021-11-17

## 2021-11-17 DIAGNOSIS — L82.1 DERMATOSIS PAPULOSA NIGRA: Primary | ICD-10-CM

## 2021-11-17 NOTE — TELEPHONE ENCOUNTER
Please let patient know I spoke with her dermatologist about the pain she has been having. Recommended a topical pain relieving cream which I have sent to the pharmacy for her to start. Recommend follow up if no improvement.

## 2021-11-29 ENCOUNTER — TELEPHONE (OUTPATIENT)
Dept: DERMATOLOGY | Age: 41
End: 2021-11-29

## 2021-11-29 NOTE — TELEPHONE ENCOUNTER
Called and spoke with patient. Advised patient that she is currently taking prednisone and that  would suggest waiting for the vaccination until she is finished with the prednisone. Patient is also scheduled for her follow up visit on 01/05.

## 2022-01-03 ENCOUNTER — OFFICE VISIT (OUTPATIENT)
Dept: DERMATOLOGY | Age: 42
End: 2022-01-03
Payer: COMMERCIAL

## 2022-01-03 VITALS — TEMPERATURE: 98.8 F

## 2022-01-03 DIAGNOSIS — L93.2 LUPUS PANNICULITIS: Primary | ICD-10-CM

## 2022-01-03 DIAGNOSIS — L93.2 LUPUS PANNICULITIS: ICD-10-CM

## 2022-01-03 PROCEDURE — 4004F PT TOBACCO SCREEN RCVD TLK: CPT | Performed by: DERMATOLOGY

## 2022-01-03 PROCEDURE — 99214 OFFICE O/P EST MOD 30 MIN: CPT | Performed by: DERMATOLOGY

## 2022-01-03 PROCEDURE — G8420 CALC BMI NORM PARAMETERS: HCPCS | Performed by: DERMATOLOGY

## 2022-01-03 PROCEDURE — G8484 FLU IMMUNIZE NO ADMIN: HCPCS | Performed by: DERMATOLOGY

## 2022-01-03 PROCEDURE — G8427 DOCREV CUR MEDS BY ELIG CLIN: HCPCS | Performed by: DERMATOLOGY

## 2022-01-03 NOTE — PROGRESS NOTES
Patient's Name: Fabian Tucker  MRN: <[id number]>  YOB: 1980  Date of Visit: 1/3/2022  Primary Care Provider: Yvette Lew DO  Referring Provider: No ref. provider found    Subjective:   Chief Complaint:   Follow-up (Patient is following up with lupus, concern of burning on the left upper lateral thigh.)      History of Present Illness:   Fabian Tucker is a 35 y.o. female who presents in clinic today for a follow up on lupus panniculitis    Last office visit: 11/10/21    At their last visit they were started on a tapering course of prednisone and was to continue plaquenil as prescribed. Patient states she was not able to complete the course of prednisone for unclear reason. She continues taking Plaquenil, however she reports missing a few doses last week. She reports area seems to be less red, however continues to have burning sensation, more on Rt thigh. Denies any new lesions. denies  fatigue or unintentional weight loss. HEENT: denies  changes in vision. GI: denies  nausea, vomiting, diarrhea or abdominal pain. Skin: denies skin itching or lip swelling. MS: denies muscle cramps. Neuro: denies headache, dizziness or difficulty walking.       Past medical and surgical histories, current medications, allergies, social and family histories were reviewed with no change since the last visit        Allergies:  No Known Allergies    Current Medications:  Current Outpatient Medications   Medication Sig Dispense Refill    diclofenac sodium (VOLTAREN) 1 % GEL Apply 4 g topically 4 times daily as needed for Pain 100 g 1    famotidine (PEPCID) 20 MG tablet Take 1 tablet by mouth 2 times daily 60 tablet 3    hydroxychloroquine (PLAQUENIL) 200 MG tablet Take 1 pill Monday, Tuesday, Wednesday and Thursday and 2 pills Friday, Saturday and Sunday 50 tablet 3    Naproxen Sodium (ALEVE PO) Take by mouth (Patient not taking: Reported on 1/3/2022)      DULoxetine (Jt White) 20 MG extended release capsule Take 1 capsule by mouth daily (Patient not taking: Reported on 11/10/2021) 90 capsule 1    clobetasol (TEMOVATE) 0.05 % ointment Apply to affected areas twice daily (Patient not taking: Reported on 1/3/2022) 60 g 2    mupirocin (BACTROBAN) 2 % ointment Apply to affected area BID for 7 days (Patient not taking: Reported on 1/3/2022) 22 g 1    mirtazapine (REMERON) 15 MG tablet Take 15 mg by mouth nightly (Patient not taking: Reported on 10/22/2021)      ibuprofen (ADVIL;MOTRIN) 600 MG tablet Take 1 tablet by mouth every 6 hours as needed for Pain (Patient not taking: Reported on 11/10/2021) 30 tablet 0    naproxen (NAPROSYN) 500 MG tablet Take 1 tablet by mouth 2 times daily as needed for Pain (Patient not taking: Reported on 11/10/2021) 40 tablet 0     No current facility-administered medications for this visit. Review of Systems:  Constitutional: No fevers, chills or recent illness. Skin: Skin:As per HPI AND otherwise no new, bleeding or symptomatic skin lesions      Objective:     Vitals:    01/03/22 1155   Temp: 98.8 °F (37.1 °C)       Physical Examination:  General: alert, comfortable, no apparent distress, well-appearing  Psych: alert, oriented and pleasant  Neuro: oriented to person, place, and time  Skin: Areas examined: head including face, lips, conjunctiva and lids, neck, hair/scalp, right lower extremity, left lower extremity, left hand, right hand and digits and nails      All areas examined were within normal limits except those listed below with the appropriate assessment and plan    Assessment and Plan (with relevant objective exam findings):     1. Lupus panniculitis  Location/objective findings: Rt lateral thigh with indurated violaceous and brown plaque with several hyperkeratotic papules. Less erythema from last visit. Discussed adding treatment with Cellcept 500 mg twice daily and increasing dose to 1000 mg twice daily as tolerated.   - Educated regarding the importance of compliance with dosing of her medication and scheduling an appointment with [facility] for baseline retinal exam.       2. Encounter for long-term (current) use of high-risk medications  Prior labs were reviewed today and were within normal limits. Discussed potential side effects of the medications used listed in this note  Alber appropriate labs (see below) to monitor potential side effects and ensure safety. CBC with diff and CMP  Recommended COVID vaccine    I spent 38 minutes face-to face with the patient today, greater than 50% of which was spent in counseling and/or coordination of care. Follow up:  Return visit in 4 weeks or as needed for change in condition. All questions addressed.      Procedure:   No procedure performed          Ban Loya MD. MS

## 2022-01-04 PROBLEM — L93.2 LUPUS PANNICULITIS: Status: ACTIVE | Noted: 2022-01-04

## 2022-01-04 LAB
ALBUMIN SERPL-MCNC: 4.6 G/DL (ref 3.4–5)
ALP BLD-CCNC: 65 U/L (ref 40–129)
ALT SERPL-CCNC: 7 U/L (ref 10–40)
ANION GAP SERPL CALCULATED.3IONS-SCNC: 16 MMOL/L (ref 3–16)
AST SERPL-CCNC: 14 U/L (ref 15–37)
BASOPHILS ABSOLUTE: 0 K/UL (ref 0–0.2)
BASOPHILS RELATIVE PERCENT: 1 %
BILIRUB SERPL-MCNC: <0.2 MG/DL (ref 0–1)
BILIRUBIN DIRECT: <0.2 MG/DL (ref 0–0.3)
BILIRUBIN, INDIRECT: ABNORMAL MG/DL (ref 0–1)
BUN BLDV-MCNC: 8 MG/DL (ref 7–20)
CALCIUM SERPL-MCNC: 9.2 MG/DL (ref 8.3–10.6)
CHLORIDE BLD-SCNC: 106 MMOL/L (ref 99–110)
CO2: 19 MMOL/L (ref 21–32)
CREAT SERPL-MCNC: 0.6 MG/DL (ref 0.6–1.1)
EOSINOPHILS ABSOLUTE: 0 K/UL (ref 0–0.6)
EOSINOPHILS RELATIVE PERCENT: 0.3 %
GFR AFRICAN AMERICAN: >60
GFR NON-AFRICAN AMERICAN: >60
GLUCOSE BLD-MCNC: 64 MG/DL (ref 70–99)
HCT VFR BLD CALC: 32 % (ref 36–48)
HEMOGLOBIN: 9.8 G/DL (ref 12–16)
LYMPHOCYTES ABSOLUTE: 1.2 K/UL (ref 1–5.1)
LYMPHOCYTES RELATIVE PERCENT: 33.3 %
MCH RBC QN AUTO: 23.9 PG (ref 26–34)
MCHC RBC AUTO-ENTMCNC: 30.6 G/DL (ref 31–36)
MCV RBC AUTO: 78.1 FL (ref 80–100)
MONOCYTES ABSOLUTE: 0.3 K/UL (ref 0–1.3)
MONOCYTES RELATIVE PERCENT: 7.8 %
NEUTROPHILS ABSOLUTE: 2 K/UL (ref 1.7–7.7)
NEUTROPHILS RELATIVE PERCENT: 57.6 %
PDW BLD-RTO: 17.7 % (ref 12.4–15.4)
PHOSPHORUS: 3.2 MG/DL (ref 2.5–4.9)
PLATELET # BLD: 392 K/UL (ref 135–450)
PMV BLD AUTO: 7.2 FL (ref 5–10.5)
POTASSIUM SERPL-SCNC: 4.6 MMOL/L (ref 3.5–5.1)
RBC # BLD: 4.09 M/UL (ref 4–5.2)
SODIUM BLD-SCNC: 141 MMOL/L (ref 136–145)
TOTAL PROTEIN: 7.9 G/DL (ref 6.4–8.2)
WBC # BLD: 3.5 K/UL (ref 4–11)

## 2022-02-21 ENCOUNTER — TELEPHONE (OUTPATIENT)
Dept: DERMATOLOGY | Age: 42
End: 2022-02-21

## 2022-02-21 NOTE — TELEPHONE ENCOUNTER
[phone] patient is requesting a return call to reschedule appt that was canceled due to snow. Please advise. Thank you!

## 2022-02-22 NOTE — TELEPHONE ENCOUNTER
Called and spoke with patient. Patient stated that she has new lesions that have came up recently. Patient is scheduled for 03/02. Patient has been advised of Dr.Elkeeb daly.

## 2022-03-02 ENCOUNTER — OFFICE VISIT (OUTPATIENT)
Dept: DERMATOLOGY | Age: 42
End: 2022-03-02
Payer: COMMERCIAL

## 2022-03-02 VITALS — TEMPERATURE: 98.4 F

## 2022-03-02 DIAGNOSIS — L93.2 LUPUS PANNICULITIS: ICD-10-CM

## 2022-03-02 DIAGNOSIS — Z79.899 ENCOUNTER FOR LONG-TERM CURRENT USE OF HIGH RISK MEDICATION: ICD-10-CM

## 2022-03-02 DIAGNOSIS — L98.8 PAPULOSQUAMOUS DERMATOSIS: Primary | ICD-10-CM

## 2022-03-02 PROCEDURE — 4004F PT TOBACCO SCREEN RCVD TLK: CPT | Performed by: DERMATOLOGY

## 2022-03-02 PROCEDURE — 99214 OFFICE O/P EST MOD 30 MIN: CPT | Performed by: DERMATOLOGY

## 2022-03-02 PROCEDURE — G8427 DOCREV CUR MEDS BY ELIG CLIN: HCPCS | Performed by: DERMATOLOGY

## 2022-03-02 PROCEDURE — G8484 FLU IMMUNIZE NO ADMIN: HCPCS | Performed by: DERMATOLOGY

## 2022-03-02 PROCEDURE — G8420 CALC BMI NORM PARAMETERS: HCPCS | Performed by: DERMATOLOGY

## 2022-03-02 PROCEDURE — 11105 PUNCH BX SKIN EA SEP/ADDL: CPT | Performed by: DERMATOLOGY

## 2022-03-02 PROCEDURE — 11104 PUNCH BX SKIN SINGLE LESION: CPT | Performed by: DERMATOLOGY

## 2022-03-02 NOTE — PROGRESS NOTES
Patient's Name: Uzma Bonilla  MRN: <[id number]>  YOB: 1980  Date of Visit: 3/2/2022  Primary Care Provider: Luciana Bowen DO  Referring Provider: No ref. provider found    Subjective:   Chief Complaint:   Follow-up (Patient has new lesions on back and chest) and Lupus      History of Present Illness:   Uzma Bonilla is a 35 y.o. female who presents in clinic today for a follow up on lupus panniculitis    Last office visit: 1/3/22    Patient reports the lesions on her thighs are no longer painful. They seemed to have healed. She does report developing new pruritic lesions on her torso and arms. She states this started with pruritus and no lesions, then small bumps appeared, which then enlarged and are intensely pruritic. Lesions are spreading to under her breast and hi areas. She has used clobetasol and vaseline for a few days at a time. She does endorse improvement of her pruritus when using the clobetasol. She reports taking plaquenil daily, however discontinued it for one week, 2 weeks ago. She states she was frustrated because of the new lesions appearing and did not think the plaquenil is working. denies  fatigue or unintentional weight loss. HEENT: denies  changes in vision. GI: denies  nausea, vomiting, diarrhea or abdominal pain. Skin: denies  skin itching or lip swelling. MS: denies  muscle cramps. Neuro: denies   headache, dizziness or difficulty walking.       Past medical and surgical histories, current medications, allergies, social and family histories were reviewed with no change since the last visit        Allergies:  No Known Allergies    Current Medications:  Current Outpatient Medications   Medication Sig Dispense Refill    hydroxychloroquine (PLAQUENIL) 200 MG tablet Take 1 pill Monday, Tuesday, Wednesday and Thursday and 2 pills Friday, Saturday and Sunday 50 tablet 3    clobetasol (TEMOVATE) 0.05 % ointment Apply to affected areas twice daily 60 g 2    diclofenac sodium (VOLTAREN) 1 % GEL Apply 4 g topically 4 times daily as needed for Pain (Patient not taking: Reported on 3/2/2022) 100 g 1    Naproxen Sodium (ALEVE PO) Take by mouth (Patient not taking: Reported on 1/3/2022)      famotidine (PEPCID) 20 MG tablet Take 1 tablet by mouth 2 times daily (Patient not taking: Reported on 3/2/2022) 60 tablet 3    DULoxetine (CYMBALTA) 20 MG extended release capsule Take 1 capsule by mouth daily (Patient not taking: Reported on 11/10/2021) 90 capsule 1    mupirocin (BACTROBAN) 2 % ointment Apply to affected area BID for 7 days (Patient not taking: Reported on 1/3/2022) 22 g 1    mirtazapine (REMERON) 15 MG tablet Take 15 mg by mouth nightly (Patient not taking: Reported on 10/22/2021)      ibuprofen (ADVIL;MOTRIN) 600 MG tablet Take 1 tablet by mouth every 6 hours as needed for Pain (Patient not taking: Reported on 11/10/2021) 30 tablet 0    naproxen (NAPROSYN) 500 MG tablet Take 1 tablet by mouth 2 times daily as needed for Pain (Patient not taking: Reported on 11/10/2021) 40 tablet 0     No current facility-administered medications for this visit. Review of Systems:  Constitutional: No fevers, chills or recent illness.    Skin: Skin:As per HPI AND otherwise no new, bleeding or symptomatic skin lesions      Objective:     Vitals:    03/02/22 0934   Temp: 98.4 °F (36.9 °C)     LABS:  Results for orders placed or performed in visit on 01/03/22   Renal Function Panel   Result Value Ref Range    Sodium 141 136 - 145 mmol/L    Potassium 4.6 3.5 - 5.1 mmol/L    Chloride 106 99 - 110 mmol/L    CO2 19 (L) 21 - 32 mmol/L    Anion Gap 16 3 - 16    Glucose 64 (L) 70 - 99 mg/dL    BUN 8 7 - 20 mg/dL    CREATININE 0.6 0.6 - 1.1 mg/dL    GFR Non-African American >60 >60    GFR African American >60 >60    Calcium 9.2 8.3 - 10.6 mg/dL    Phosphorus 3.2 2.5 - 4.9 mg/dL    Albumin 4.6 3.4 - 5.0 g/dL   Hepatic Function Panel   Result Value Ref Range    Total specimen was sent to pathology for diagnosis. We will call patient or send note on Mychart with biopsy results as soon as they are available, and discuss treatment options as needed. Wound care was discussed and written instructions also given to patient. 2. Lupus panniculitis stable on Hydroxychloroquine  - Continue current dosing of hydroxychloroquine 200 mg M,T,W and 400 mg Thu, Fri, Sat and Sunday      3. Encounter for long-term (current) use of high-risk medications  Prior labs were reviewed today and were within normal limits. Discussed potential side effects of the medications used listed in this note  - Referral to Ophthalmology for baseline retinal exam          Follow up:  Return visit in 2 weeks or as needed for change in condition. All questions addressed. Procedure:   Procedure: PUNCH BIOPSY ([id number], +[id number])  Location:  Rt lateral torso (superior and inferior)  Indication: Papulosquamous dermatosis  Size:  4mm    Risks, benefits and alternatives were explained and verbal informed consent was obtained. The areas were photographed with the patient's permission. The areas were cleaned with alcohol and infiltrated with 1% lidocaine with epinephrine. Biopsies were obtained with a 4 mm punch. A full thickness sample was obtained. Closure was with Nylon 4-0 sutures. Vaseline ointment and sterile dressing were applied and wound care instructions were given verbally and in writing. The patient tolerated the procedures well with no complications. The patient will be notified by mail or telephone of pathology results and was instructed to call if he has not received notification within three weeks. Sutures are to be removed in 14 days.           Minna Abdalla MD. MS

## 2022-03-02 NOTE — PATIENT INSTRUCTIONS
Wound care instructions    1. Keep bandage dry for 24 hours  2. Clean with soap and water twice daily next day  3 Apply Vaseline or Aquaphor ointment twice daily and cover with small dressing or Band-Aid for 10-14 days on extremities and torso, and 5-7 days on face and neck. · Studies show that wounds heal better when covered with ointment and a bandage. 4. Please return as instructed to have the stitches removed. Typically 5-7 days on face and neck and 10-14 days on torso and extremities. FREQUENTLY ASKED QUESTIONS:  · It is OK to get the wound wet when washing or bathing (after 24 hrs). · If bleeding should occur, apply firm direct pressure for 20 minutes CONTINUOUSLY. After 20 minutes, you may check to see if bleeding has stopped. If bleeding persists, please repeat CONTINUOUS PRESSURE for another 20 minutes WITHOUT LOOKING. · If bleeding still persists, call the office at [phone]  · Signs of infection include increased redness extending over 1 centimeter from the wound, increased warmth, yellow to green purulent (pus-like) discharge, and significantly increased tenderness to palpation. If you have any concerns regarding infection or develop fevers or chills, please call. You will be contacted with the results of your procedure when we receive them, usually within two weeks. Please call the office at [phone] between 8 am-4 pm Monday-Friday if you have not heard from us.

## 2022-03-03 LAB — DERMATOLOGY PATHOLOGY REPORT: NORMAL

## 2022-03-07 ENCOUNTER — TELEPHONE (OUTPATIENT)
Dept: PRIMARY CARE CLINIC | Age: 42
End: 2022-03-07

## 2022-03-07 NOTE — TELEPHONE ENCOUNTER
Pt calling for referral to another derm.   She says she was referred to [facility] but that dr is leaving effective 4-29-22

## 2022-03-15 ENCOUNTER — OFFICE VISIT (OUTPATIENT)
Dept: DERMATOLOGY | Age: 42
End: 2022-03-15
Payer: COMMERCIAL

## 2022-03-15 VITALS — TEMPERATURE: 98.4 F

## 2022-03-15 DIAGNOSIS — L23.89 ALLERGIC CONTACT DERMATITIS DUE TO OTHER AGENTS: Primary | ICD-10-CM

## 2022-03-15 DIAGNOSIS — L81.0 POST-INFLAMMATORY HYPERPIGMENTATION: ICD-10-CM

## 2022-03-15 PROCEDURE — G8420 CALC BMI NORM PARAMETERS: HCPCS | Performed by: DERMATOLOGY

## 2022-03-15 PROCEDURE — G8427 DOCREV CUR MEDS BY ELIG CLIN: HCPCS | Performed by: DERMATOLOGY

## 2022-03-15 PROCEDURE — 4004F PT TOBACCO SCREEN RCVD TLK: CPT | Performed by: DERMATOLOGY

## 2022-03-15 PROCEDURE — 99213 OFFICE O/P EST LOW 20 MIN: CPT | Performed by: DERMATOLOGY

## 2022-03-15 PROCEDURE — G8484 FLU IMMUNIZE NO ADMIN: HCPCS | Performed by: DERMATOLOGY

## 2022-03-16 NOTE — TELEPHONE ENCOUNTER
[facility] never responded. ... Can you have patient check with insurance to see who she can see? [facility] maybe?

## 2022-03-17 ENCOUNTER — TELEPHONE (OUTPATIENT)
Dept: PRIMARY CARE CLINIC | Age: 42
End: 2022-03-17

## 2022-03-17 DIAGNOSIS — L82.1 DERMATOSIS PAPULOSA NIGRA: Primary | ICD-10-CM

## 2022-03-17 NOTE — PROGRESS NOTES
Patient's Name: Ila Dakin  MRN: [id number]  YOB: 1980  Date of Visit: 3/15/2022  Primary Care Provider: Terell Power DO  Referring Provider: No ref. provider found    Subjective:   Chief Complaint:   Suture / Staple Removal and Follow-up (Rash )      History of Present Illness:   Ila Dakin is a 35 y.o. female who presents in clinic today for a follow up on rash biopsied at last visit. Last office visit: 3/2/22    At her last visit patient had biopsies performed from a rash she developed revealing subacute dermatitis with numerous eosinophils more consistent with contact dermatitis. Patient was instructed to apply clobetasol twice daily to the lesions until she follows up. She reports lesions improved and are no longer pruritic. No new lesions since last visit. Patient reports her thighs are stable and have not been tender. She is on plaquenil as prescribed. Constitutional: General feeling about health is: feels well; denies  fatigue or unintentional weight loss. HEENT: denies  changes in vision. GI: denies  nausea, vomiting, diarrhea or abdominal pain. Skin: denies  skin itching or lip swelling. MS: denies  muscle cramps. Neuro: denies   headache, dizziness or difficulty walking.     Past medical and surgical histories, current medications, allergies, social and family histories were reviewed with no change since the last visit        Allergies:  No Known Allergies    Current Medications:  Current Outpatient Medications   Medication Sig Dispense Refill    hydroxychloroquine (PLAQUENIL) 200 MG tablet Take 1 pill Monday, Tuesday, Wednesday and Thursday and 2 pills Friday, Saturday and Sunday 50 tablet 3    clobetasol (TEMOVATE) 0.05 % ointment Apply to affected areas twice daily 60 g 2    diclofenac sodium (VOLTAREN) 1 % GEL Apply 4 g topically 4 times daily as needed for Pain (Patient not taking: Reported on 3/2/2022) 100 g 1    Naproxen Sodium (ALEVE PO) Take by mouth (Patient not taking: Reported on 1/3/2022)      famotidine (PEPCID) 20 MG tablet Take 1 tablet by mouth 2 times daily (Patient not taking: Reported on 3/2/2022) 60 tablet 3    DULoxetine (CYMBALTA) 20 MG extended release capsule Take 1 capsule by mouth daily (Patient not taking: Reported on 11/10/2021) 90 capsule 1    mupirocin (BACTROBAN) 2 % ointment Apply to affected area BID for 7 days (Patient not taking: Reported on 1/3/2022) 22 g 1    mirtazapine (REMERON) 15 MG tablet Take 15 mg by mouth nightly (Patient not taking: Reported on 10/22/2021)      ibuprofen (ADVIL;MOTRIN) 600 MG tablet Take 1 tablet by mouth every 6 hours as needed for Pain (Patient not taking: Reported on 11/10/2021) 30 tablet 0    naproxen (NAPROSYN) 500 MG tablet Take 1 tablet by mouth 2 times daily as needed for Pain (Patient not taking: Reported on 11/10/2021) 40 tablet 0     No current facility-administered medications for this visit. Review of Systems:  Constitutional: No fevers, chills or recent illness. Skin: Skin:As per HPI AND otherwise no new, bleeding or symptomatic skin lesions      Objective:     Vitals:    03/15/22 1335   Temp: 98.4 °F (36.9 °C)       Physical Examination:  General: alert, comfortable, no apparent distress, well-appearing  Psych: alert, oriented and pleasant  Neuro: oriented to person, place, and time  Skin: Areas examined: head including face, lips, conjunctiva and lids, neck, hair/scalp, abdomen, back, right upper extremity, left upper extremity, right lower extremity, left lower extremity, left hand, right hand and digits and nails      All areas examined were within normal limits except those listed below with the appropriate assessment and plan    Assessment and Plan (with relevant objective exam findings):     1. Allergic contact dermatitis improving  Hyperpigmented patches and macules.  Two annular plaques with slight erythema  - Patient to continue using clobetasol twice daily for an extra few days, then as needed with flares. 2. Post-inflammatory hyperpigmentation  Location: Torso  Findings: hyperpigmented patches    Discussed that this is due to the underlying inflammation in the skin and if we can control the cause well, it should fade gradually with time      Follow up:  Return visit in 4 weeks or as needed for change in condition. All questions addressed. Urged patient to establish with a new dermatologist promptly due to my departure from [facility].     Procedure:   No procedure performed      Hema Cervantes MD. MS

## 2022-03-18 NOTE — TELEPHONE ENCOUNTER
Pt calling back to request a referral to [facility]. She says they want to see a referral first before they will talk to her.   She says she was not given a fax number but the number she called in [phone]

## 2022-03-18 NOTE — TELEPHONE ENCOUNTER
There is another open TE about this. I would really prefer to see who her dermatologist recommended. They have not responded to my message on the EMR.

## 2022-04-01 NOTE — TELEPHONE ENCOUNTER
Please let patient know that Dr. Jarrod Millan did respond and recommended one of the following practices if she was unable to get into [facility]. Please check with patient. The following are practices that have been accepting new patients: [facility]   [facility] (was told it is a [facility] network)   [facility] [address]   [facility].

## 2022-04-01 NOTE — TELEPHONE ENCOUNTER
Referral faxed again to [phone]  Called patient, asked her to call [facility] in a day or two to schedule herself an appointment

## 2022-04-13 ENCOUNTER — OFFICE VISIT (OUTPATIENT)
Dept: DERMATOLOGY | Age: 42
End: 2022-04-13
Payer: COMMERCIAL

## 2022-04-13 VITALS — TEMPERATURE: 98.4 F

## 2022-04-13 DIAGNOSIS — L93.2 LUPUS PANNICULITIS: Primary | ICD-10-CM

## 2022-04-13 DIAGNOSIS — L30.0 NUMMULAR DERMATITIS: ICD-10-CM

## 2022-04-13 DIAGNOSIS — L81.0 POST-INFLAMMATORY HYPERPIGMENTATION: ICD-10-CM

## 2022-04-13 PROCEDURE — G8420 CALC BMI NORM PARAMETERS: HCPCS | Performed by: DERMATOLOGY

## 2022-04-13 PROCEDURE — G8427 DOCREV CUR MEDS BY ELIG CLIN: HCPCS | Performed by: DERMATOLOGY

## 2022-04-13 PROCEDURE — 4004F PT TOBACCO SCREEN RCVD TLK: CPT | Performed by: DERMATOLOGY

## 2022-04-13 PROCEDURE — 99214 OFFICE O/P EST MOD 30 MIN: CPT | Performed by: DERMATOLOGY

## 2022-04-13 RX ORDER — HYDROXYCHLOROQUINE SULFATE 200 MG/1
TABLET, FILM COATED ORAL
Qty: 50 TABLET | Refills: 3 | Status: SHIPPED | OUTPATIENT
Start: 2022-04-13

## 2022-04-13 RX ORDER — CLOBETASOL PROPIONATE 0.5 MG/G
OINTMENT TOPICAL
Qty: 60 G | Refills: 2 | Status: SHIPPED | OUTPATIENT
Start: 2022-04-13

## 2022-04-13 NOTE — PROGRESS NOTES
Patient's Name: Scott Burnett  MRN: [id number]  YOB: 1980  Date of Visit: 4/13/2022  Primary Care Provider: Sahra Nickerson DO  Referring Provider: No ref. provider found    Subjective:   Chief Complaint:   Follow-up      History of Present Illness:   Scott Burnett is a 35 y.o. female who presents in clinic today for a follow up on lupus panniculitis and eczema    Last office visit: 3/2/22    Patient reports her lupus lesions on her thighs improved and healed well. She does endorse some burning sensation of the skin on the Lt thigh. No pain reported however. Patient endorses not taking her Plaquenil regularly. She is however tolerating it. Constitutional: General feeling about health is: feels well; denies  fatigue or unintentional weight loss. HEENT: denies  changes in vision. GI: denies  nausea, vomiting, diarrhea or abdominal pain. Skin: denies  skin itching or lip swelling. MS: denies  muscle cramps. Neuro: denies   headache, dizziness or difficulty walking. She had her exam at [facility] on 3/23/22 and is to follow up in 4 years      She reports new rough eczema spots on torso that have been pruritic.   She has not been using clobetasol on the new lesions      Past medical and surgical histories, current medications, allergies, social and family histories were reviewed with no change since the last visit        Allergies:  No Known Allergies    Current Medications:  Current Outpatient Medications   Medication Sig Dispense Refill    hydroxychloroquine (PLAQUENIL) 200 MG tablet Take 1 pill Monday, Tuesday, Wednesday and Thursday and 2 pills Friday, Saturday and Sunday 50 tablet 3    clobetasol (TEMOVATE) 0.05 % ointment Apply to affected areas twice daily 60 g 2    diclofenac sodium (VOLTAREN) 1 % GEL Apply 4 g topically 4 times daily as needed for Pain (Patient not taking: Reported on 3/2/2022) 100 g 1    Naproxen Sodium (ALEVE PO) Take by mouth (Patient not taking: Reported on 1/3/2022)      famotidine (PEPCID) 20 MG tablet Take 1 tablet by mouth 2 times daily (Patient not taking: Reported on 3/2/2022) 60 tablet 3    DULoxetine (CYMBALTA) 20 MG extended release capsule Take 1 capsule by mouth daily (Patient not taking: Reported on 11/10/2021) 90 capsule 1    mupirocin (BACTROBAN) 2 % ointment Apply to affected area BID for 7 days (Patient not taking: Reported on 1/3/2022) 22 g 1    mirtazapine (REMERON) 15 MG tablet Take 15 mg by mouth nightly (Patient not taking: Reported on 10/22/2021)      ibuprofen (ADVIL;MOTRIN) 600 MG tablet Take 1 tablet by mouth every 6 hours as needed for Pain (Patient not taking: Reported on 11/10/2021) 30 tablet 0    naproxen (NAPROSYN) 500 MG tablet Take 1 tablet by mouth 2 times daily as needed for Pain (Patient not taking: Reported on 11/10/2021) 40 tablet 0     No current facility-administered medications for this visit. Review of Systems:  Constitutional: No fevers, chills or recent illness. Skin: Skin:As per HPI AND otherwise no new, bleeding or symptomatic skin lesions      Objective:     Vitals:    04/13/22 0917   Temp: 98.4 °F (36.9 °C)       Physical Examination:  General: alert, comfortable, no apparent distress, well-appearing  Psych: alert, oriented and pleasant  Neuro: oriented to person, place, and time  Skin: Areas examined: head including face, lips, conjunctiva and lids, neck, hair/scalp, abdomen, back, right upper extremity, left upper extremity, right lower extremity, left lower extremity, left hand, right hand and digits and nails      All areas examined were within normal limits except those listed below with the appropriate assessment and plan    Assessment and Plan (with relevant objective exam findings):     1. Lupus panniculitis improving  Discussed with patient the importance of compliance with the medication.  Reviewed side effects and we also revisited the possibility of adding Cellcept versus UV-A. Patient is scheduled to see dermatology at [facility] in May.  - Continue plaquenil 200 mg tabs. Take 1 pill Monday, Tuesday, Wednesday and Thursday and 2 pills Friday, Saturday and Sunday  Reviewed potential side effects of headaches, GI upset, rare risk of blood count abnormalities and transaminitis as well as potential for retinopathy with long-term use of medication. 2. 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 Rt thigh with hyperpigmented patches  Reviewed the nature of this condition and the likelihood of flares. - Clobetasol 0.05% ointment twice daily as needed with flares. Limit use to 2-3 weeks at a time to avoid lightening/thinning of skin, atrophy, tachyphylaxis. Follow up:   All questions addressed.  [facility] is scheduled with [facility] in [facility]    Procedure:   No procedure performed          Ady Jack MD. MS

## 2022-08-01 ENCOUNTER — HOSPITAL ENCOUNTER (EMERGENCY)
Age: 42
Discharge: HOME | End: 2022-08-01
Attending: EMERGENCY MEDICINE
Payer: COMMERCIAL

## 2022-08-01 VITALS
TEMPERATURE: 99.4 F | RESPIRATION RATE: 16 BRPM | HEIGHT: 67 IN | SYSTOLIC BLOOD PRESSURE: 92 MMHG | DIASTOLIC BLOOD PRESSURE: 63 MMHG | WEIGHT: 122.3 LBS | BODY MASS INDEX: 19.19 KG/M2 | OXYGEN SATURATION: 96 % | HEART RATE: 54 BPM

## 2022-08-01 DIAGNOSIS — M54.32 SCIATICA OF LEFT SIDE: Primary | ICD-10-CM

## 2022-08-01 PROCEDURE — 99284 EMERGENCY DEPT VISIT MOD MDM: CPT

## 2022-08-01 PROCEDURE — 2580000003 HC RX 258: Performed by: STUDENT IN AN ORGANIZED HEALTH CARE EDUCATION/TRAINING PROGRAM

## 2022-08-01 PROCEDURE — 96375 TX/PRO/DX INJ NEW DRUG ADDON: CPT

## 2022-08-01 PROCEDURE — 96365 THER/PROPH/DIAG IV INF INIT: CPT

## 2022-08-01 PROCEDURE — 6360000002 HC RX W HCPCS: Performed by: STUDENT IN AN ORGANIZED HEALTH CARE EDUCATION/TRAINING PROGRAM

## 2022-08-01 PROCEDURE — 6370000000 HC RX 637 (ALT 250 FOR IP): Performed by: STUDENT IN AN ORGANIZED HEALTH CARE EDUCATION/TRAINING PROGRAM

## 2022-08-01 RX ORDER — GABAPENTIN 300 MG/1
300 CAPSULE ORAL ONCE
Status: COMPLETED | OUTPATIENT
Start: 2022-08-01 | End: 2022-08-01

## 2022-08-01 RX ORDER — LIDOCAINE 4 G/G
1 PATCH TOPICAL DAILY
Qty: 1 BOX | Refills: 0 | Status: SHIPPED | OUTPATIENT
Start: 2022-08-01 | End: 2022-08-15

## 2022-08-01 RX ORDER — MYCOPHENOLATE MOFETIL 500 MG/1
1000 TABLET ORAL 2 TIMES DAILY
COMMUNITY
Start: 2022-06-30

## 2022-08-01 RX ORDER — METHYLPREDNISOLONE 4 MG/1
TABLET ORAL
Qty: 1 KIT | Refills: 0 | Status: SHIPPED | OUTPATIENT
Start: 2022-08-01 | End: 2022-08-07

## 2022-08-01 RX ORDER — ACETAMINOPHEN 500 MG
1000 TABLET ORAL 3 TIMES DAILY PRN
Qty: 180 TABLET | Refills: 0 | Status: SHIPPED | OUTPATIENT
Start: 2022-08-01

## 2022-08-01 RX ORDER — GABAPENTIN 300 MG/1
300 CAPSULE ORAL 3 TIMES DAILY
Qty: 42 CAPSULE | Refills: 0 | Status: SHIPPED | OUTPATIENT
Start: 2022-08-01 | End: 2022-08-15

## 2022-08-01 RX ORDER — ACETAMINOPHEN 500 MG
1000 TABLET ORAL ONCE
Status: COMPLETED | OUTPATIENT
Start: 2022-08-01 | End: 2022-08-01

## 2022-08-01 RX ORDER — LIDOCAINE 4 G/G
1 PATCH TOPICAL DAILY
Status: DISCONTINUED | OUTPATIENT
Start: 2022-08-01 | End: 2022-08-01 | Stop reason: HOSPADM

## 2022-08-01 RX ORDER — METHOCARBAMOL 500 MG/1
500 TABLET, FILM COATED ORAL 4 TIMES DAILY
Qty: 56 TABLET | Refills: 0 | Status: SHIPPED | OUTPATIENT
Start: 2022-08-01 | End: 2022-08-15

## 2022-08-01 RX ADMIN — METHOCARBAMOL 1000 MG: 100 INJECTION INTRAMUSCULAR; INTRAVENOUS at 12:46

## 2022-08-01 RX ADMIN — GABAPENTIN 300 MG: 300 CAPSULE ORAL at 12:25

## 2022-08-01 RX ADMIN — ACETAMINOPHEN 1000 MG: 500 TABLET ORAL at 12:25

## 2022-08-01 RX ADMIN — HYDROMORPHONE HYDROCHLORIDE 0.5 MG: 1 INJECTION, SOLUTION INTRAMUSCULAR; INTRAVENOUS; SUBCUTANEOUS at 12:37

## 2022-08-01 ASSESSMENT — ENCOUNTER SYMPTOMS
VOMITING: 0
DIARRHEA: 0
ABDOMINAL PAIN: 0
EYE DISCHARGE: 0
NAUSEA: 0
BACK PAIN: 1
SORE THROAT: 0
CONSTIPATION: 0
EYE REDNESS: 0
SHORTNESS OF BREATH: 0
RHINORRHEA: 0
COUGH: 0

## 2022-08-01 ASSESSMENT — PAIN DESCRIPTION - ORIENTATION
ORIENTATION: LEFT;LOWER
ORIENTATION: LEFT

## 2022-08-01 ASSESSMENT — PAIN DESCRIPTION - FREQUENCY: FREQUENCY: CONTINUOUS

## 2022-08-01 ASSESSMENT — PAIN DESCRIPTION - LOCATION
LOCATION: BACK
LOCATION: BACK

## 2022-08-01 ASSESSMENT — PAIN DESCRIPTION - PAIN TYPE
TYPE: ACUTE PAIN
TYPE: ACUTE PAIN

## 2022-08-01 ASSESSMENT — PAIN DESCRIPTION - DESCRIPTORS: DESCRIPTORS: SHARP

## 2022-08-01 ASSESSMENT — PAIN - FUNCTIONAL ASSESSMENT: PAIN_FUNCTIONAL_ASSESSMENT: 0-10

## 2022-08-01 ASSESSMENT — PAIN SCALES - GENERAL: PAINLEVEL_OUTOF10: 6

## 2022-08-01 NOTE — DISCHARGE INSTRUCTIONS
You were seen in the emergency department for back and left leg pain. --Your symptoms are due to sciatica, or nerve pain associated with the sciatic nerve. --You received medications for pain. --You were able to walk in the ED after receiving pain medications. --You are being discharged home with prescriptions for several medications. It is very important that you take these medications as prescribed. --Follow up with your primary care provider as soon as possible to discuss your symptoms. Return to the hospital immediately if you experience worsening pain, weakness in your right leg, loss of bowel or bladder control, or any other new or concerning symptoms.

## 2022-08-01 NOTE — ED PROVIDER NOTES
[address] [facility]          EM RESIDENT NOTE       Date of evaluation: 8/1/2022    Chief Complaint     Back Pain (Left side)    History of Present Illness     Bekah Beard is a 35 y.o. female w/ PMHx SLE c/b panniculitis to L lateral thigh on Plaquenil and mycophenolate who presents w/ low back pain radiating down the length of her LLE x2 days. Reports that pain began gradually and is now severe, 10/10 in intensity and intolerable. Pain is sharp and extends from her L low back to her toes, worse w/ movement. Additionally endorses weakness in LLE and mild loss of bladder control this AM.  Pain is similar to what she has experienced previously in RLE, attributed to sciatica at that time. Denies recent fevers, chills, saddle anesthesia, loss of bowel control, urinary retention or hesitancy. No recent injury to back or LLE. No history of malignancy or IVDU. Review of Systems     Review of Systems   Constitutional:  Negative for appetite change, chills, diaphoresis, fatigue, fever and unexpected weight change. HENT:  Negative for congestion, rhinorrhea, sneezing and sore throat. Eyes:  Negative for discharge and redness. Respiratory:  Negative for cough and shortness of breath. Cardiovascular:  Negative for chest pain, palpitations and leg swelling. Gastrointestinal:  Negative for abdominal pain, constipation, diarrhea, nausea and vomiting. Endocrine: Negative for polydipsia and polyuria. Genitourinary:  Negative for dysuria, flank pain, frequency, hematuria and urgency. Mild stress incontinence w/ coughing this AM   Musculoskeletal:  Positive for back pain. Negative for joint swelling, myalgias, neck pain and neck stiffness. Skin:  Negative for rash and wound. Neurological:  Positive for weakness. Negative for syncope, light-headedness, numbness and headaches.    Psychiatric/Behavioral:  Negative for agitation, confusion and decreased eye: No discharge. Left eye: No discharge. Extraocular Movements: Extraocular movements intact. Conjunctiva/sclera: Conjunctivae normal.      Pupils: Pupils are equal, round, and reactive to light. Cardiovascular:      Rate and Rhythm: Regular rhythm. Tachycardia present. Pulses: Normal pulses. Heart sounds: Normal heart sounds. No murmur heard. No friction rub. No gallop. Comments: Tachycardic, sinus rhythm on monitor. Pulmonary:      Effort: Pulmonary effort is normal. No respiratory distress. Breath sounds: Normal breath sounds. No wheezing, rhonchi or rales. Abdominal:      General: Abdomen is flat. There is no distension. Palpations: Abdomen is soft. Tenderness: no abdominal tenderness There is no guarding or rebound. Comments: Low back pain reproducible w/ L straight leg raise   Musculoskeletal:         General: No swelling, tenderness, deformity or signs of injury. Normal range of motion. Cervical back: Normal range of motion and neck supple. No rigidity or tenderness. Right lower leg: No edema. Left lower leg: No edema. Comments: +midline lumbosacral TTP   Skin:     General: Skin is warm and dry. Capillary Refill: Capillary refill takes less than 2 seconds. Findings: Lesion present. No bruising, erythema or rash. Comments: Well healing area lupus panniculitis over L lateral thigh w/ small areas of overlying eschar. Neurological:      Mental Status: She is alert and oriented to person, place, and time. Mental status is at baseline. Motor: Weakness present. Comments: +weakness w/ R ankle plantarflexion, knee flexion/extension   Psychiatric:         Mood and Affect: Mood normal.         Behavior: Behavior normal.         Thought Content:  Thought content normal.         Judgment: Judgment normal.     Diagnostic Results     EKG   None performed    RADIOLOGY:  No orders to display     LABS:   No results Dilaudid and Robaxin given IV, oral acetaminophen and gabapentin, and topical Lidoderm patch applied. On reassessment patient reports that her pain is much improved and she is able to rise and ambulate w/ stable, narrow based gait. She has a PCP whom she likes and can follow up with easily. Discussed discharge home w/ prompt PCP follow up and referral to [facility] to discuss possible surgical intervention given recurrent bilateral sciatica. Reviewed strict return precautions and provided prescriptions for acetaminophen, Robaxin, gabapentin and Lidoderm patches. This patient was also evaluated by the attending physician. All care plans were discussed and agreed upon. Clinical Impression     1. Sciatica of left side        Disposition     PATIENT REFERRED TO:  [facility]  [address]      DISCHARGE MEDICATIONS:  Discharge Medication List as of 8/1/2022  3:47 PM        START taking these medications    Details   acetaminophen (TYLENOL) 500 MG tablet Take 2 tablets by mouth 3 times daily as needed for Pain, Disp-180 tablet, R-0Print      gabapentin (NEURONTIN) 300 MG capsule Take 1 capsule by mouth in the morning and 1 capsule at noon and 1 capsule before bedtime. Do all this for 14 days. , Disp-42 capsule, R-0Print      lidocaine 4 % external patch Place 1 patch onto the skin in the morning for 14 days. , TransDERmal, DAILY Starting Mon 8/1/2022, Until Mon 8/15/2022, For 14 days, Disp-1 box, R-0, Print      methocarbamol (ROBAXIN) 500 MG tablet Take 1 tablet by mouth in the morning and 1 tablet at noon and 1 tablet in the evening and 1 tablet before bedtime. Do all this for 14 days. , Disp-56 tablet, R-0Print      methylPREDNISolone (MEDROL DOSEPACK) 4 MG tablet Take by mouth., Disp-1 kit, R-0Print             DISPOSITION Decision To Discharge 08/01/2022 03:38:45 Alvin Marie MD  Resident  08/01/22 2015

## 2022-08-01 NOTE — ED TRIAGE NOTES
Pt presents with complaints of left side back pain x2 days. Pt reports having this pain before and was dx with sciatica which was occurring on the right side of her back. Pt reports that she's taken otc Aleve with minimal effectiveness at this time. Describes pain as sharp and radiating down her left leg and is constant with 10/10 intensity.

## 2022-08-01 NOTE — ED PROVIDER NOTES
ED Attending Attestation Note     Date of evaluation: 8/1/2022    This patient was seen by the resident. I have seen and examined the patient, agree with the workup, evaluation, management and diagnosis. The care plan has been discussed. My assessment reveals a slender, overall well-developed appearing patient, in severe discomfort on my initial exam, but in no acute respiratory distress. Patient has a history of chronic low back pain with intermittent flares of mostly right-sided sciatica, and presents today with severe low back pain, now with pain radiating down the posterior aspect of the left lower extremity. She endorses some loss of bladder control this morning, but describes an urge incontinence, rather than overflow incontinence. On initial examination, she has some weakness in strength of the left lower extremity. However, once multimodal pain control was employed and the patient's pain was significantly improved, she thereafter had an entirely normal neurologic examination. At this point, there is no indication for imaging. The patient is given prescriptions for ongoing outpatient pain control, and a referral to spine medicine, and plans to follow-up with her primary care physician.        Vicente Olivera MD  08/10/22 1117

## 2022-08-03 ENCOUNTER — OFFICE VISIT (OUTPATIENT)
Dept: PRIMARY CARE CLINIC | Age: 42
End: 2022-08-03
Payer: COMMERCIAL

## 2022-08-03 VITALS
SYSTOLIC BLOOD PRESSURE: 111 MMHG | TEMPERATURE: 98.2 F | OXYGEN SATURATION: 99 % | WEIGHT: 117.2 LBS | DIASTOLIC BLOOD PRESSURE: 76 MMHG | HEART RATE: 98 BPM | HEIGHT: 66 IN | BODY MASS INDEX: 18.84 KG/M2

## 2022-08-03 DIAGNOSIS — R29.898 LEFT LEG WEAKNESS: ICD-10-CM

## 2022-08-03 DIAGNOSIS — M54.16 LUMBAR RADICULOPATHY: Primary | ICD-10-CM

## 2022-08-03 DIAGNOSIS — R63.4 UNEXPLAINED WEIGHT LOSS: ICD-10-CM

## 2022-08-03 PROCEDURE — G8427 DOCREV CUR MEDS BY ELIG CLIN: HCPCS | Performed by: FAMILY MEDICINE

## 2022-08-03 PROCEDURE — G8420 CALC BMI NORM PARAMETERS: HCPCS | Performed by: FAMILY MEDICINE

## 2022-08-03 PROCEDURE — 4004F PT TOBACCO SCREEN RCVD TLK: CPT | Performed by: FAMILY MEDICINE

## 2022-08-03 PROCEDURE — 99213 OFFICE O/P EST LOW 20 MIN: CPT | Performed by: FAMILY MEDICINE

## 2022-08-03 SDOH — ECONOMIC STABILITY: FOOD INSECURITY: WITHIN THE PAST 12 MONTHS, YOU WORRIED THAT YOUR FOOD WOULD RUN OUT BEFORE YOU GOT MONEY TO BUY MORE.: SOMETIMES TRUE

## 2022-08-03 SDOH — ECONOMIC STABILITY: FOOD INSECURITY: WITHIN THE PAST 12 MONTHS, THE FOOD YOU BOUGHT JUST DIDN'T LAST AND YOU DIDN'T HAVE MONEY TO GET MORE.: SOMETIMES TRUE

## 2022-08-03 ASSESSMENT — PATIENT HEALTH QUESTIONNAIRE - PHQ9
SUM OF ALL RESPONSES TO PHQ QUESTIONS 1-9: 2
1. LITTLE INTEREST OR PLEASURE IN DOING THINGS: 1
2. FEELING DOWN, DEPRESSED OR HOPELESS: 1
SUM OF ALL RESPONSES TO PHQ9 QUESTIONS 1 & 2: 2

## 2022-08-03 ASSESSMENT — SOCIAL DETERMINANTS OF HEALTH (SDOH): HOW HARD IS IT FOR YOU TO PAY FOR THE VERY BASICS LIKE FOOD, HOUSING, MEDICAL CARE, AND HEATING?: SOMEWHAT HARD

## 2022-08-03 NOTE — PROGRESS NOTES
[address] [facility] [address] PRIMARY CARE  [address] [place] [address]  Dept: [phone]  Dept Fax: [phone]     8/3/2022      Sindy Marques   1980     Chief Complaint   Patient presents with    Hip Pain    Follow-up     [facility] ER, left hip pain, down leg to foot       HPI    Pt comes in today for ER follow up. Seen for left sided sciatica on 8/1/22. No imaging done. This has been a recurring issue for her. She had relief with gabapentin in the ER. Discharged with gabapentin, lidocaine patch, robaxin and steroid. States that night she took the tylenol and possibly the gabapentin and robaxin but is unsure. Has not taken anything today. She states pain is slightly improved. + pins/needles on that side. Weakness is a little better. Has never seen a specialist before or had an MRI. States she is very stressed. Low appetite. Weight is down to 117 from 132 at our last visit a year ago. She has custody of 2 of her grandchildren. Daughter has 5 children total and all with different family members. Dealing with stressful behavioral issues with the grandchildren. Wt Readings from Last 5 Encounters:   08/03/22 117 lb 3.2 oz (53.2 kg)   08/01/22 122 lb 4.8 oz (55.5 kg)   08/17/21 132 lb 9.6 oz (60.1 kg)   07/31/21 125 lb (56.7 kg)   10/20/20 125 lb (56.7 kg)      BP Readings from Last 5 Encounters:   08/03/22 111/76   08/01/22 92/63   08/17/21 132/87   07/31/21 (!) 153/108   10/20/20 (!) 140/96         No data recorded       Prior to Visit Medications    Medication Sig Taking? Authorizing Provider   mycophenolate (CELLCEPT) 500 MG tablet Take 1,000 mg by mouth in the morning and 1,000 mg before bedtime. Yes Historical Provider, MD   acetaminophen (TYLENOL) 500 MG tablet Take 2 tablets by mouth 3 times daily as needed for Pain Yes Ana Bermeo MD   lidocaine 4 % external patch Place 1 patch onto the skin in the morning for 14 days.  Yes Fatoumata Chan MD constipation, diarrhea, nausea and vomiting. Musculoskeletal:  Positive for back pain and gait problem. Neurological:  Positive for weakness. Negative for syncope. /76 (Cuff Size: Medium Adult)   Pulse 98   Temp 98.2 °F (36.8 °C) (Temporal)   Ht 5' 6\" (1.676 m)   Wt 117 lb 3.2 oz (53.2 kg)   LMP 08/02/2022   SpO2 99% Comment: room air  BMI 18.92 kg/m²      Physical Exam  Vitals reviewed. Constitutional:       General: She is not in acute distress. Appearance: Normal appearance. She is well-developed. She is not ill-appearing or toxic-appearing. Eyes:      General: No scleral icterus. Conjunctiva/sclera: Conjunctivae normal.   Neck:      Thyroid: No thyromegaly. Cardiovascular:      Rate and Rhythm: Normal rate and regular rhythm. Heart sounds: No murmur heard. Pulmonary:      Effort: Pulmonary effort is normal. No respiratory distress. Breath sounds: Normal breath sounds. Abdominal:      General: There is no distension. Palpations: Abdomen is soft. Tenderness: There is no abdominal tenderness. Musculoskeletal:      Cervical back: Neck supple. Lumbar back: Spasms present. Positive left straight leg raise test. Negative right straight leg raise test.      Right lower leg: No edema. Left lower leg: No edema. Comments: + slightly reduced strength on left side, exam is somewhat limited 2/2 patient's level of pain   Lymphadenopathy:      Cervical: No cervical adenopathy. Skin:     General: Skin is warm and dry. Capillary Refill: Capillary refill takes less than 2 seconds. Neurological:      General: No focal deficit present. Mental Status: She is alert. Mental status is at baseline. Psychiatric:         Mood and Affect: Mood normal.       Assessment:  Encounter Diagnoses   Name Primary? Lumbar radiculopathy Yes    Left leg weakness     Unexplained weight loss        Plan:    - Patient with suspected lumbar radiculopathy. Questionable weakness on left side. Needs MRI. - I have recommended she hold the gabapentin and to take the muscle relaxer in the evenings as well as finish the medrol pack. - Close follow up for the weight with physical in 3 months.      New Prescriptions    No medications on file        Orders Placed This Encounter   Procedures    MRI LUMBAR SPINE [address]        Return in about 3 months (around 11/3/2022) for Fasting physical.         Justine Sidhu,

## 2022-08-03 NOTE — PATIENT INSTRUCTIONS
[facility] Laboratory Locations - No appointment necessary. @ indicates the location is open Saturdays in addition to Monday through Friday. Call your preferred location for test preparation, business hours and other information you need. [employer] accepts [employer]. [facility]    @ [place] [facility] S[facility] [address] [facility] Maricel Matt. [place], [address]   Ph: [phone] [facility] S[facility]. [address]   Ph: [phone]  @ [facility] Svcs. [address] [facility]   Ph: [phone]    [facility] Svcs. [address]ula [address]   Ph: [phone] @ [place] [facility] S[facility]cs. [address]  Ph: [phone] @ [facility] Svcs. [address] [facility] [address]   Ph: [phone]     [facility]. [facility]  Ph: [phone]    [place]   @ [place] [facility] S[facility]cs[facility] [address] [place], [place] [address]   Ph: [phone] [place] [facility] Bldg. [address] [facility], [address]  Ph: [phone]:  [address] Lab Svcs[address] [facility]   Ph: [phone] [facility]. Lab Svcs.   [address] [facility], [address] [place] [address]   Ph: [phone]

## 2022-08-08 ASSESSMENT — ENCOUNTER SYMPTOMS
VOMITING: 0
CONSTIPATION: 0
ABDOMINAL PAIN: 0
BACK PAIN: 1
DIARRHEA: 0
BLOOD IN STOOL: 0
NAUSEA: 0

## 2022-08-17 ENCOUNTER — HOSPITAL ENCOUNTER (OUTPATIENT)
Dept: MRI IMAGING | Age: 42
Discharge: HOME | End: 2022-08-17
Payer: COMMERCIAL

## 2022-08-17 DIAGNOSIS — M54.16 LUMBAR RADICULOPATHY: ICD-10-CM

## 2022-08-17 DIAGNOSIS — R29.898 LEFT LEG WEAKNESS: ICD-10-CM

## 2022-08-17 PROCEDURE — 72148 MRI LUMBAR SPINE W/O DYE: CPT

## 2022-08-19 ENCOUNTER — TELEPHONE (OUTPATIENT)
Dept: PRIMARY CARE CLINIC | Age: 42
End: 2022-08-19

## 2022-08-19 DIAGNOSIS — M54.17 LUMBOSACRAL RADICULOPATHY AT S1: Primary | ICD-10-CM

## 2022-08-19 NOTE — TELEPHONE ENCOUNTER
Please let patient know that her MRI indicates compression on her S1 nerve which is what is causing the pain/sciatica. I have sent a referral to the spine clinic. They should contact her within 2 days.

## 2022-08-24 ENCOUNTER — TELEPHONE (OUTPATIENT)
Dept: ORTHOPEDIC SURGERY | Age: 42
End: 2022-08-24

## 2022-08-24 NOTE — TELEPHONE ENCOUNTER
Spoke with patient in regards to the back and spine referral we received from her PCP. Patient was unable to schedule at the time of call and will give us a callback when she's ready. Patient was provided the back and spine call center number, [phone].

## 2022-09-01 ENCOUNTER — TELEPHONE (OUTPATIENT)
Dept: PRIMARY CARE CLINIC | Age: 42
End: 2022-09-01

## 2022-09-01 NOTE — TELEPHONE ENCOUNTER
FYI:  Dr Isauro Laboy, [facility] health dermatology. She wants to alert us to Nettie's recent low iron values. She asked the patient to schedule an appointment with us to review this. She wanted us to know as well so we can reach out if patient does not schedule. Dr Isauro Laboy believes if may be from heavy menstrual cycles but it needs to be addressed by primary care. Patient is on plaquenil and cell cept    She will be faxing labs. Labs have been scanned in.     Called patient and scheduled her for next week

## 2022-09-08 ENCOUNTER — OFFICE VISIT (OUTPATIENT)
Dept: PRIMARY CARE CLINIC | Age: 42
End: 2022-09-08
Payer: COMMERCIAL

## 2022-09-08 VITALS
HEIGHT: 66 IN | DIASTOLIC BLOOD PRESSURE: 73 MMHG | WEIGHT: 117.4 LBS | HEART RATE: 71 BPM | OXYGEN SATURATION: 100 % | TEMPERATURE: 97.9 F | SYSTOLIC BLOOD PRESSURE: 116 MMHG | BODY MASS INDEX: 18.87 KG/M2

## 2022-09-08 DIAGNOSIS — R63.4 UNEXPLAINED WEIGHT LOSS: ICD-10-CM

## 2022-09-08 DIAGNOSIS — D50.9 MICROCYTIC ANEMIA: Primary | ICD-10-CM

## 2022-09-08 PROCEDURE — 99213 OFFICE O/P EST LOW 20 MIN: CPT | Performed by: FAMILY MEDICINE

## 2022-09-08 PROCEDURE — G8420 CALC BMI NORM PARAMETERS: HCPCS | Performed by: FAMILY MEDICINE

## 2022-09-08 PROCEDURE — 4004F PT TOBACCO SCREEN RCVD TLK: CPT | Performed by: FAMILY MEDICINE

## 2022-09-08 PROCEDURE — G8427 DOCREV CUR MEDS BY ELIG CLIN: HCPCS | Performed by: FAMILY MEDICINE

## 2022-09-08 RX ORDER — FERROUS SULFATE 325(65) MG
325 TABLET ORAL
COMMUNITY
Start: 2022-08-30

## 2022-09-08 NOTE — PROGRESS NOTES
[address] [facility] [address] PRIMARY CARE  [address] [place] [address]  Dept: [phone]  Dept Fax: [phone]     9/8/2022      Gia Speaks   1980     Chief Complaint   Patient presents with    Other     Patient was recommended to make an appointment by dermatology due to low iron levels. HPI    Pt comes in today for follow up anemia at the request of her dermatologist. Has long standing microcytic anemia. Has a h/o menorrhagia. GYN did an EMB and ultimately placed a mirena 10/2019. At the time, was recommended to have to have a stool test to check for blood and see hematologist for iron infusions, which she declined. Has not followed up with hematologist since 2018. She still has the mirena in place. Light bleeding when she does have a cycle, but is unpredictable. Dermatologist just started her on iron supplement which she just picked it up. Labs 8/30/22 - Hg = 10.0. Anemia > 10 years per labs review. Iron = 12, low. Appetite has been low. She has been very stressed, she has lost 15-20 lbs over the last year. Denies any blood in her stool. Prior to Visit Medications    Medication Sig Taking? Authorizing Provider   ferrous sulfate (IRON 325) 325 (65 Fe) MG tablet Take 325 mg by mouth daily (with breakfast) Yes Historical Provider, MD   mycophenolate (CELLCEPT) 500 MG tablet Take 1,000 mg by mouth in the morning and 1,000 mg before bedtime.  Yes Historical Provider, MD   acetaminophen (TYLENOL) 500 MG tablet Take 2 tablets by mouth 3 times daily as needed for Pain Yes Alex Sherman MD   hydroxychloroquine (PLAQUENIL) 200 MG tablet Take 1 pill Monday, Tuesday, Wednesday and Thursday and 2 pills Friday, Saturday and Sunday Yes Cheikh Wills MD   clobetasol (TEMOVATE) 0.05 % ointment Apply to affected areas twice daily Yes Cheikh Wills MD   gabapentin (NEURONTIN) 300 MG capsule Take 1 capsule by mouth in the morning and 1 capsule at noon and Palpations: Abdomen is soft. Tenderness: There is no abdominal tenderness. Musculoskeletal:      Cervical back: Neck supple. Right lower leg: No edema. Left lower leg: No edema. Lymphadenopathy:      Cervical: No cervical adenopathy. Skin:     General: Skin is warm and dry. Capillary Refill: Capillary refill takes less than 2 seconds. Neurological:      General: No focal deficit present. Mental Status: She is alert. Mental status is at baseline. Psychiatric:         Mood and Affect: Mood normal.       Assessment:  Encounter Diagnoses   Name Primary? Microcytic anemia Yes    Unexplained weight loss        Plan:    - Patient with longstanding MAYA. Previously believed to be 2/2 menorrhagia which has been addressed by GYN. She did see hematology/oncology who recommended colon cancer screening which she ultimately declined in 2019, but is willing to consider now. I have explained to her that since she is no longer having the heavy periods, it would be very important to r/o colon cancer as a potential cause since her hemoglobin has remained low and she has noted recent weight loss. Extensive counseling provided and she is agreeable to schedule. Start iron once daily. New Prescriptions    No medications on file        Orders Placed This Encounter   Procedures    LINDSAY - Aury Bangura MD, Gastroenterology, [facility]        Return if symptoms worsen or fail to improve.          [address], DO

## 2022-09-13 ASSESSMENT — ENCOUNTER SYMPTOMS
BLOOD IN STOOL: 0
ABDOMINAL PAIN: 0

## 2022-09-30 ENCOUNTER — HOSPITAL ENCOUNTER (OUTPATIENT)
Age: 42
Setting detail: OUTPATIENT SURGERY
Discharge: HOME | End: 2022-09-30
Attending: INTERNAL MEDICINE | Admitting: INTERNAL MEDICINE
Payer: COMMERCIAL

## 2022-09-30 ENCOUNTER — ANESTHESIA (OUTPATIENT)
Dept: ENDOSCOPY | Age: 42
End: 2022-09-30
Payer: COMMERCIAL

## 2022-09-30 ENCOUNTER — ANESTHESIA EVENT (OUTPATIENT)
Dept: ENDOSCOPY | Age: 42
End: 2022-09-30
Payer: COMMERCIAL

## 2022-09-30 VITALS
HEIGHT: 67 IN | WEIGHT: 120 LBS | SYSTOLIC BLOOD PRESSURE: 148 MMHG | BODY MASS INDEX: 18.83 KG/M2 | DIASTOLIC BLOOD PRESSURE: 106 MMHG | HEART RATE: 66 BPM | TEMPERATURE: 97.4 F | RESPIRATION RATE: 18 BRPM | OXYGEN SATURATION: 100 %

## 2022-09-30 DIAGNOSIS — D50.9 IRON DEFICIENCY ANEMIA, UNSPECIFIED IRON DEFICIENCY ANEMIA TYPE: ICD-10-CM

## 2022-09-30 LAB — PREGNANCY, URINE: NEGATIVE

## 2022-09-30 PROCEDURE — 3700000000 HC ANESTHESIA ATTENDED CARE: Performed by: INTERNAL MEDICINE

## 2022-09-30 PROCEDURE — 2500000003 HC RX 250 WO HCPCS: Performed by: NURSE ANESTHETIST, CERTIFIED REGISTERED

## 2022-09-30 PROCEDURE — 3609012400 HC EGD TRANSORAL BIOPSY SINGLE/MULTIPLE: Performed by: INTERNAL MEDICINE

## 2022-09-30 PROCEDURE — 3700000001 HC ADD 15 MINUTES (ANESTHESIA): Performed by: INTERNAL MEDICINE

## 2022-09-30 PROCEDURE — 6360000002 HC RX W HCPCS: Performed by: NURSE ANESTHETIST, CERTIFIED REGISTERED

## 2022-09-30 PROCEDURE — 84703 CHORIONIC GONADOTROPIN ASSAY: CPT

## 2022-09-30 PROCEDURE — 88305 TISSUE EXAM BY PATHOLOGIST: CPT

## 2022-09-30 PROCEDURE — 2580000003 HC RX 258: Performed by: ANESTHESIOLOGY

## 2022-09-30 PROCEDURE — 2709999900 HC NON-CHARGEABLE SUPPLY: Performed by: INTERNAL MEDICINE

## 2022-09-30 PROCEDURE — 3609027000 HC COLONOSCOPY: Performed by: INTERNAL MEDICINE

## 2022-09-30 PROCEDURE — 7100000010 HC PHASE II RECOVERY - FIRST 15 MIN: Performed by: INTERNAL MEDICINE

## 2022-09-30 PROCEDURE — 88342 IMHCHEM/IMCYTCHM 1ST ANTB: CPT

## 2022-09-30 PROCEDURE — 7100000011 HC PHASE II RECOVERY - ADDTL 15 MIN: Performed by: INTERNAL MEDICINE

## 2022-09-30 RX ORDER — SODIUM CHLORIDE 9 MG/ML
INJECTION, SOLUTION INTRAVENOUS PRN
Status: DISCONTINUED | OUTPATIENT
Start: 2022-09-30 | End: 2022-09-30 | Stop reason: HOSPADM

## 2022-09-30 RX ORDER — SODIUM CHLORIDE, SODIUM LACTATE, POTASSIUM CHLORIDE, CALCIUM CHLORIDE 600; 310; 30; 20 MG/100ML; MG/100ML; MG/100ML; MG/100ML
INJECTION, SOLUTION INTRAVENOUS CONTINUOUS
Status: DISCONTINUED | OUTPATIENT
Start: 2022-09-30 | End: 2022-09-30 | Stop reason: HOSPADM

## 2022-09-30 RX ORDER — SODIUM CHLORIDE 0.9 % (FLUSH) 0.9 %
5-40 SYRINGE (ML) INJECTION PRN
Status: DISCONTINUED | OUTPATIENT
Start: 2022-09-30 | End: 2022-09-30 | Stop reason: HOSPADM

## 2022-09-30 RX ORDER — LIDOCAINE HYDROCHLORIDE 10 MG/ML
1 INJECTION, SOLUTION EPIDURAL; INFILTRATION; INTRACAUDAL; PERINEURAL
Status: DISCONTINUED | OUTPATIENT
Start: 2022-09-30 | End: 2022-09-30 | Stop reason: HOSPADM

## 2022-09-30 RX ORDER — LIDOCAINE HYDROCHLORIDE 20 MG/ML
INJECTION, SOLUTION EPIDURAL; INFILTRATION; INTRACAUDAL; PERINEURAL PRN
Status: DISCONTINUED | OUTPATIENT
Start: 2022-09-30 | End: 2022-09-30 | Stop reason: SDUPTHER

## 2022-09-30 RX ORDER — PROPOFOL 10 MG/ML
INJECTION, EMULSION INTRAVENOUS PRN
Status: DISCONTINUED | OUTPATIENT
Start: 2022-09-30 | End: 2022-09-30 | Stop reason: SDUPTHER

## 2022-09-30 RX ORDER — SODIUM CHLORIDE 0.9 % (FLUSH) 0.9 %
5-40 SYRINGE (ML) INJECTION EVERY 12 HOURS SCHEDULED
Status: DISCONTINUED | OUTPATIENT
Start: 2022-09-30 | End: 2022-09-30 | Stop reason: HOSPADM

## 2022-09-30 RX ADMIN — PROPOFOL 50 MG: 10 INJECTION, EMULSION INTRAVENOUS at 12:35

## 2022-09-30 RX ADMIN — PROPOFOL 100 MG: 10 INJECTION, EMULSION INTRAVENOUS at 12:23

## 2022-09-30 RX ADMIN — PROPOFOL 50 MG: 10 INJECTION, EMULSION INTRAVENOUS at 12:29

## 2022-09-30 RX ADMIN — PROPOFOL 50 MG: 10 INJECTION, EMULSION INTRAVENOUS at 12:26

## 2022-09-30 RX ADMIN — PROPOFOL 50 MG: 10 INJECTION, EMULSION INTRAVENOUS at 12:44

## 2022-09-30 RX ADMIN — PROPOFOL 50 MG: 10 INJECTION, EMULSION INTRAVENOUS at 12:24

## 2022-09-30 RX ADMIN — LIDOCAINE HYDROCHLORIDE 100 MG: 20 INJECTION, SOLUTION EPIDURAL; INFILTRATION; INTRACAUDAL; PERINEURAL at 12:23

## 2022-09-30 RX ADMIN — SODIUM CHLORIDE, POTASSIUM CHLORIDE, SODIUM LACTATE AND CALCIUM CHLORIDE: 600; 310; 30; 20 INJECTION, SOLUTION INTRAVENOUS at 11:59

## 2022-09-30 RX ADMIN — PROPOFOL 50 MG: 10 INJECTION, EMULSION INTRAVENOUS at 12:46

## 2022-09-30 ASSESSMENT — PAIN SCALES - GENERAL
PAINLEVEL_OUTOF10: 0

## 2022-09-30 ASSESSMENT — PAIN - FUNCTIONAL ASSESSMENT: PAIN_FUNCTIONAL_ASSESSMENT: NONE - DENIES PAIN

## 2022-09-30 NOTE — PROGRESS NOTES
Ambulatory Surgery/Procedure Discharge Note    Vitals:    09/30/22 1315   BP: (!) 148/106   Pulse: 66   Resp: 18   Temp:    SpO2: 100%   B/p meets vidal score    In: 100 [I.V.:100]  Out: -     Restroom use offered before discharge. Yes    Pain assessment:  level of pain (1-10, 10 severe)  Pain Level: 0        Patient discharged to home/self care.  Patient discharged via wheel chair by transporter to waiting family/S.O.       9/30/2022 1330

## 2022-09-30 NOTE — H&P
Gastroenterology Note                 Pre-operative History and Physical    Patient: Mike Wilde  : 1980  CSN:     History Obtained From:   Patient or guardian. HISTORY OF PRESENT ILLNESS:    The patient is a 35 y.o. female here for EGD/colon for iron deficiency anemia, 15 pound weight loss, dysphagia    Past Medical History:    Past Medical History:   Diagnosis Date    Depression      Past Surgical History:    Past Surgical History:   Procedure Laterality Date    TUBAL LIGATION       Medications Prior to Admission:   No current facility-administered medications on file prior to encounter. Current Outpatient Medications on File Prior to Encounter   Medication Sig Dispense Refill    ferrous sulfate (IRON 325) 325 (65 Fe) MG tablet Take 325 mg by mouth daily (with breakfast)      mycophenolate (CELLCEPT) 500 MG tablet Take 1,000 mg by mouth in the morning and 1,000 mg before bedtime. acetaminophen (TYLENOL) 500 MG tablet Take 2 tablets by mouth 3 times daily as needed for Pain 180 tablet 0    gabapentin (NEURONTIN) 300 MG capsule Take 1 capsule by mouth in the morning and 1 capsule at noon and 1 capsule before bedtime. Do all this for 14 days. (Patient not taking: Reported on 8/3/2022) 42 capsule 0    hydroxychloroquine (PLAQUENIL) 200 MG tablet Take 1 pill Monday, Tuesday, Wednesday and Thursday and 2 pills Friday, Saturday and  50 tablet 3    clobetasol (TEMOVATE) 0.05 % ointment Apply to affected areas twice daily 60 g 2    Naproxen Sodium (ALEVE PO) Take by mouth (Patient not taking: No sig reported)          Allergies:  Patient has no known allergies.       Social History:   Social History     Tobacco Use    Smoking status: Every Day     Packs/day: 1.00     Types: Cigarettes    Smokeless tobacco: Never   Substance Use Topics    Alcohol use: Yes     Comment: BOTTLE OF LIQOUR DAILY     Family History:   Family History   Problem Relation Age of Onset    No Known Problems Mother     No Known Problems Father     Seizures Other     Asthma Other     Breast Cancer Other        PHYSICAL EXAM:      BP (!) 143/100   Pulse 64   Temp 98 °F (36.7 °C) (Temporal)   Resp 16   Ht 5' 7\" (1.702 m)   Wt 120 lb (54.4 kg)   LMP 09/04/2022   SpO2 100%   BMI 18.79 kg/m²  I        Heart:  RRR, normal s1s2    Lungs:  CTA and normal effort    Abdomen:   Soft, nt nd. ASSESSMENT AND PLAN:    1. Patient is a 35 y.o. female here for endoscopy with MAC sedation. 2.  Procedure options, risks and benefits reviewed with patient and/or guardian. They express understanding.      Sandi Bowling MD  [address] [place] [address]

## 2022-09-30 NOTE — DISCHARGE INSTRUCTIONS
ENDOSCOPY DISCHARGE INSTRUCTIONS:    Call the physician that did your procedure for any questions or concern:    [facility]: [phone]  DR. MARCELLA ROSE      ACTIVITY:    There are potential side effects to the medications used for sedation and anesthesia during your procedure. These include:  Dizziness or light-headedness, confusion or memory loss, delayed reaction times, loss of coordination, nausea and vomiting. Because of your increased risk for injury, we ask that you observe the following precautions: For the next 24 hours,  DO NOT operate an automobile, bicycle, motorcycle, , power tools or large equipment of any kind. Do not drink alcohol, sign any legal documents or make any legal decisions for 24 hours. Do not bend your head over lower than your heart. DO sit on the side of bed/couch awhile before getting up. Plan on bedrest or quiet relaxation today. You may resume normal activities in 24 hours. DIET:    Your first meal today should be light, avoiding spicy and fatty foods. If you tolerate this first meal, then you may advance to your regular diet unless otherwise advised by your physician. NORMAL SYMPTOMS:  -Mild sore throat if youve had an EGD   -Gaseous discomfort    NOTIFY YOUR PHYSICIAN IF THESE SYMPTOMS OCCUR:  1. Fever (greater than 100)  5. Increased abdominal bloating  2. Severe pain    6. Excessive bleeding  3. Nausea and vomiting  7. Chest pain                                                                    4. Chills    8. Shortness of breath    ADDITIONAL INSTRUCTIONS:    Biopsy results: Call [address],[facility] for biopsy results in 1 week    Educational Information:          Please review these discharge instructions this evening or tomorrow for  information you may have forgotten. We want to thank you for choosing the [facility] as your health care provider. We always strive to provide you with excellent care while you are here.  You may receive a survey in the mail regarding your care. We would appreciate you taking a few minutes of your time to complete this survey.

## 2022-09-30 NOTE — PROCEDURES
[address] [place] [address]/[facility]  Colonoscopy Note    Patient: Elias Vidal  : 1980  Acct#:     Procedure: Colonoscopy     Date:  2022    Surgeon:  Dayna Duane, MD    Referring Physician:  Becki Saenz MD    Anesthesia: IV propofol, per anesthesia    EBL: <50 mL    Indications:     Procedure: An informed consent was obtained from the patient after explanation of indications, benefits, possible risks and complications of the procedure. The patient was then taken to the endoscopy suite, placed in the left lateral decubitus position, and the above IV anesthesia was administered. A digital rectal examination was performed and revealed negative without mass, lesions or tenderness. The Olympus PCFQ-H190 video colonoscope was placed in the patient's rectum under digital direction and advanced to the cecum and terminal ileum. The cecum was identified by characteristic anatomy and ballottment. The prep was adequate. Findings:  Normal terminal ileum. Normal colon. The scope was then withdrawn into the rectum and retroflexed. The retroflexed view of the anal verge and rectum demonstrates no hemorrhoids. The scope was straightened, the colon was decompressed and the scope was withdrawn from the patient. The patient tolerated the procedure well and was taken to the PACU in good condition. Biopsies: No      Impression:   Normal terminal ileum.   Normal colon    Recommendations:  Colonoscopy in 10 years    Becki Saenz MD  [facility]

## 2022-09-30 NOTE — ANESTHESIA POSTPROCEDURE EVALUATION
Department of Anesthesiology  Postprocedure Note    Patient: Nicole Baez  MRN: [id number]  YOB: 1980  Date of evaluation: 9/30/2022      Procedure Summary     Date: 09/30/22 Room / Location: [facility]    Anesthesia Start: 1219 Anesthesia Stop: 1252    Procedures:       EGD BIOPSY      COLONOSCOPY Diagnosis:       Iron deficiency anemia, unspecified iron deficiency anemia type      (Iron deficiency anemia, unspecified iron deficiency anemia type [D50.9])    Surgeons: Jv Ochoa MD Responsible Provider: Zain Chavarria MD    Anesthesia Type: MAC ASA Status: 2          Anesthesia Type: No value filed. Nicole Phase I: Nicole Score: 10    Nicole Phase II:        Anesthesia Post Evaluation    Patient location during evaluation: PACU  Patient participation: complete - patient participated  Level of consciousness: awake  Pain score: 0  Airway patency: patent  Nausea & Vomiting: no nausea and no vomiting  Complications: no  Cardiovascular status: blood pressure returned to baseline  Respiratory status: acceptable  Hydration status: euvolemic  There was medical reason for not using a multimodal analgesia pain management approach.

## 2022-09-30 NOTE — PROCEDURES
[address] [place] [address]  Endoscopy Note    Patient: Gaye García  : 1980  Acct#:     Procedure: Esophagogastroduodenoscopy with biopsy    Date:  2022     Surgeon:  Lopez Alegria MD      Anesthesia:    IV propofol, per anesthesia       EBL: <50 mL    Indications:  iron deficiency anemia, dysphagia, weight loss    Description of Procedure:    Informed consent was obtained from the patient after explanation of indications, benefits and possible risks and complications of the procedure. The patient was then taken to the endoscopy suite, placed in the left lateral decubitus position and the above IV sedation was administrered. The Olympus videoendoscope ([id number]-H190) was placed in the patient's mouth and under direct visualization passed into the esophagus. The scope was then advanced into the stomach and to the second portion of the duodenum. A retroflexed exam of the gastric cardia and fundus was performed. The scope was then withdrawn back into the stomach, it was decompressed, and the scope was completely withdrawn. Findings:  Normal first and second portion of the duodenum. Biopsies were obtained evaluate for celiac disease. Diffuse erythema was found around the gastric antrum and body with multiple areas overlying hematin consistent with hemorrhagic gastritis. Biopsies were obtained evaluate for H. pylori. Normal esophagus. The patient tolerated the procedure well and was taken to the post anesthesia care unit in good condition. Biopsies: Yes. Impression:    Normal first and second portion of the duodenum. Biopsies were obtained evaluate for celiac disease. Diffuse erythema was found around the gastric antrum and body with multiple areas overlying hematin consistent with hemorrhagic gastritis. Biopsies were obtained evaluate for H. pylori. Normal esophagus. Recommendations:   Await pathology results. PPI daily.   Colonoscopy today      MARCELLA Hancock, MD  [facility] [place]

## 2022-09-30 NOTE — ANESTHESIA PRE PROCEDURE
Department of Anesthesiology  Preprocedure Note       Name:  Nicole Baez   Age:  35 y.o.  :  1980                                          MRN:  [id number]         Date:  2022      Surgeon: Chivo Doty):  Jv Ochoa MD    Procedure: Procedure(s):  ESOPHAGOGASTRODUODENOSCOPY  COLONOSCOPY    Medications prior to admission:   Prior to Admission medications    Medication Sig Start Date End Date Taking? Authorizing Provider   ferrous sulfate (IRON 325) 325 (65 Fe) MG tablet Take 325 mg by mouth daily (with breakfast) 22   Historical Provider, MD   mycophenolate (CELLCEPT) 500 MG tablet Take 1,000 mg by mouth in the morning and 1,000 mg before bedtime. 22   Historical Provider, MD   acetaminophen (TYLENOL) 500 MG tablet Take 2 tablets by mouth 3 times daily as needed for Pain 22   Ana Buckley MD   gabapentin (NEURONTIN) 300 MG capsule Take 1 capsule by mouth in the morning and 1 capsule at noon and 1 capsule before bedtime. Do all this for 14 days.   Patient not taking: Reported on 8/3/2022 8/1/22 8/15/22  Rubina Short MD   hydroxychloroquine (PLAQUENIL) 200 MG tablet Take 1 pill Monday, Tuesday, Wednesday and Thursday and 2 pills Friday, Saturday and 22   Mara Russell MD   clobetasol (TEMOVATE) 0.05 % ointment Apply to affected areas twice daily 22   Mara Russell MD   Naproxen Sodium (ALEVE PO) Take by mouth  Patient not taking: No sig reported    Historical Provider, MD       Current medications:    Current Facility-Administered Medications   Medication Dose Route Frequency Provider Last Rate Last Admin    lidocaine PF 1 % injection 1 mL  1 mL IntraDERmal Once PRN Rafaela Macias MD        lactated ringers infusion   IntraVENous Continuous Rafaela Macias MD        sodium chloride flush 0.9 % injection 5-40 mL  5-40 mL IntraVENous 2 times per day Rafaela Macias MD        sodium chloride flush 0.9 % injection 5-40 mL  5-40 mL IntraVENous PRN Sohan Jameson MD        0.9 % sodium chloride infusion   IntraVENous PRN Sohan Jameson MD           Allergies:  No Known Allergies    Problem List:    Patient Active Problem List   Diagnosis Code    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    Lupus panniculitis L93.2       Past Medical History:        Diagnosis Date    Depression        Past Surgical History:        Procedure Laterality Date    TUBAL LIGATION         Social History:    Social History     Tobacco Use    Smoking status: Every Day     Packs/day: 1.00     Types: Cigarettes    Smokeless tobacco: Never   Substance Use Topics    Alcohol use: Yes     Comment: BOTTLE OF LIQOUR DAILY                                Ready to quit: Not Answered  Counseling given: Not Answered      Vital Signs (Current):   Vitals:    09/30/22 1144   BP: (!) 143/100   Pulse: 64   Resp: 16   Temp: 98 °F (36.7 °C)   TempSrc: Temporal   SpO2: 100%   Weight: 120 lb (54.4 kg)   Height: 5' 7\" (1.702 m)                                              BP Readings from Last 3 Encounters:   09/30/22 (!) 143/100   09/08/22 116/73   08/03/22 111/76       NPO Status: Time of last liquid consumption: 0730                        Time of last solid consumption: 1900                        Date of last liquid consumption: 09/30/22                        Date of last solid food consumption: 09/28/22    BMI:   Wt Readings from Last 3 Encounters:   09/30/22 120 lb (54.4 kg)   09/08/22 117 lb 6.4 oz (53.3 kg)   08/03/22 117 lb 3.2 oz (53.2 kg)     Body mass index is 18.79 kg/m².     CBC:   Lab Results   Component Value Date/Time    WBC 3.5 01/03/2022 12:29 PM    RBC 4.09 01/03/2022 12:29 PM    HGB 9.8 01/03/2022 12:29 PM    HCT 32.0 01/03/2022 12:29 PM    MCV 78.1 01/03/2022 12:29 PM    RDW 17.7 01/03/2022 12:29 PM     01/03/2022 12:29 PM       CMP:   Lab Results   Component Value Date/Time     01/03/2022 12:29 PM    K 4.6 01/03/2022 12:29 PM     01/03/2022 12:29 PM    CO2 19 01/03/2022 12:29 PM    BUN 8 01/03/2022 12:29 PM    CREATININE 0.6 01/03/2022 12:29 PM    GFRAA >60 01/03/2022 12:29 PM    AGRATIO 1.2 09/13/2021 10:19 AM    LABGLOM >60 01/03/2022 12:29 PM    GLUCOSE 64 01/03/2022 12:29 PM    PROT 7.9 01/03/2022 12:29 PM    CALCIUM 9.2 01/03/2022 12:29 PM    BILITOT <0.2 01/03/2022 12:29 PM    ALKPHOS 65 01/03/2022 12:29 PM    AST 14 01/03/2022 12:29 PM    ALT 7 01/03/2022 12:29 PM       POC Tests: No results for input(s): POCGLU, POCNA, POCK, POCCL, POCBUN, POCHEMO, POCHCT in the last 72 hours. Coags: No results found for: PROTIME, INR, APTT    HCG (If Applicable):   Lab Results   Component Value Date    PREGTESTUR Negative 10/18/2017        ABGs: No results found for: PHART, PO2ART, IND6XAD, ARC4LYC, BEART, Q0PWZYRU     Type & Screen (If Applicable):  No results found for: LABABO, LABRH    Drug/Infectious Status (If Applicable):  No results found for: HIV, HEPCAB    COVID-19 Screening (If Applicable): No results found for: COVID19        Anesthesia Evaluation  Patient summary reviewed and Nursing notes reviewed no history of anesthetic complications:   Airway: Mallampati: II  TM distance: >3 FB   Neck ROM: full  Mouth opening: > = 3 FB   Dental: normal exam         Pulmonary:normal exam                               Cardiovascular:  Exercise tolerance: good (>4 METS),       (-)  angina, orthopnea and PND      Rhythm: regular  Rate: normal           Beta Blocker:  Not on Beta Blocker         Neuro/Psych:   (+) psychiatric history:            GI/Hepatic/Renal:             Endo/Other:                     Abdominal:             Vascular: Other Findings:           Anesthesia Plan      MAC     ASA 2       Induction: intravenous. Anesthetic plan and risks discussed with patient. Plan discussed with CRNA.                     Mehran Steven DO   9/30/2022

## 2022-11-04 ENCOUNTER — HOSPITAL ENCOUNTER (EMERGENCY)
Age: 42
Discharge: HOME | End: 2022-11-04
Attending: EMERGENCY MEDICINE
Payer: COMMERCIAL

## 2022-11-04 ENCOUNTER — APPOINTMENT (OUTPATIENT)
Dept: GENERAL RADIOLOGY | Age: 42
End: 2022-11-04
Payer: COMMERCIAL

## 2022-11-04 VITALS
DIASTOLIC BLOOD PRESSURE: 85 MMHG | HEIGHT: 67 IN | HEART RATE: 90 BPM | SYSTOLIC BLOOD PRESSURE: 120 MMHG | TEMPERATURE: 99.6 F | OXYGEN SATURATION: 93 % | RESPIRATION RATE: 20 BRPM | BODY MASS INDEX: 18.79 KG/M2

## 2022-11-04 DIAGNOSIS — J10.1 INFLUENZA A: Primary | ICD-10-CM

## 2022-11-04 LAB
ANION GAP SERPL CALCULATED.3IONS-SCNC: 10 MMOL/L (ref 3–16)
BASOPHILS ABSOLUTE: 0 K/UL (ref 0–0.2)
BASOPHILS RELATIVE PERCENT: 0.5 %
BUN BLDV-MCNC: 6 MG/DL (ref 7–20)
CALCIUM SERPL-MCNC: 8.9 MG/DL (ref 8.3–10.6)
CHLORIDE BLD-SCNC: 101 MMOL/L (ref 99–110)
CO2: 24 MMOL/L (ref 21–32)
CREAT SERPL-MCNC: 0.7 MG/DL (ref 0.6–1.1)
EOSINOPHILS ABSOLUTE: 0 K/UL (ref 0–0.6)
EOSINOPHILS RELATIVE PERCENT: 0.2 %
GFR SERPL CREATININE-BSD FRML MDRD: >60 ML/MIN/{1.73_M2}
GLUCOSE BLD-MCNC: 114 MG/DL (ref 70–99)
HCT VFR BLD CALC: 34 % (ref 36–48)
HEMOGLOBIN: 11.2 G/DL (ref 12–16)
INFLUENZA A: DETECTED
INFLUENZA B: NOT DETECTED
LYMPHOCYTES ABSOLUTE: 0.4 K/UL (ref 1–5.1)
LYMPHOCYTES RELATIVE PERCENT: 7.5 %
MAGNESIUM: 1.7 MG/DL (ref 1.8–2.4)
MCH RBC QN AUTO: 27.6 PG (ref 26–34)
MCHC RBC AUTO-ENTMCNC: 32.9 G/DL (ref 31–36)
MCV RBC AUTO: 83.8 FL (ref 80–100)
MONOCYTES ABSOLUTE: 0.5 K/UL (ref 0–1.3)
MONOCYTES RELATIVE PERCENT: 11 %
NEUTROPHILS ABSOLUTE: 3.8 K/UL (ref 1.7–7.7)
NEUTROPHILS RELATIVE PERCENT: 80.8 %
PDW BLD-RTO: 22 % (ref 12.4–15.4)
PLATELET # BLD: 319 K/UL (ref 135–450)
PMV BLD AUTO: 7.2 FL (ref 5–10.5)
POTASSIUM REFLEX MAGNESIUM: 3.5 MMOL/L (ref 3.5–5.1)
RBC # BLD: 4.06 M/UL (ref 4–5.2)
SARS-COV-2 RNA, RT PCR: NOT DETECTED
SODIUM BLD-SCNC: 135 MMOL/L (ref 136–145)
WBC # BLD: 4.7 K/UL (ref 4–11)

## 2022-11-04 PROCEDURE — 87636 SARSCOV2 & INF A&B AMP PRB: CPT

## 2022-11-04 PROCEDURE — 6370000000 HC RX 637 (ALT 250 FOR IP): Performed by: STUDENT IN AN ORGANIZED HEALTH CARE EDUCATION/TRAINING PROGRAM

## 2022-11-04 PROCEDURE — 96365 THER/PROPH/DIAG IV INF INIT: CPT

## 2022-11-04 PROCEDURE — 83735 ASSAY OF MAGNESIUM: CPT

## 2022-11-04 PROCEDURE — 99284 EMERGENCY DEPT VISIT MOD MDM: CPT

## 2022-11-04 PROCEDURE — 80048 BASIC METABOLIC PNL TOTAL CA: CPT

## 2022-11-04 PROCEDURE — 2580000003 HC RX 258: Performed by: STUDENT IN AN ORGANIZED HEALTH CARE EDUCATION/TRAINING PROGRAM

## 2022-11-04 PROCEDURE — 71045 X-RAY EXAM CHEST 1 VIEW: CPT

## 2022-11-04 PROCEDURE — 85025 COMPLETE CBC W/AUTO DIFF WBC: CPT

## 2022-11-04 RX ORDER — ACETAMINOPHEN 500 MG
1000 TABLET ORAL ONCE
Status: COMPLETED | OUTPATIENT
Start: 2022-11-04 | End: 2022-11-04

## 2022-11-04 RX ORDER — NAPROXEN 500 MG/1
500 TABLET ORAL
Status: COMPLETED | OUTPATIENT
Start: 2022-11-04 | End: 2022-11-04

## 2022-11-04 RX ORDER — SODIUM CHLORIDE, SODIUM GLUCONATE, SODIUM ACETATE, POTASSIUM CHLORIDE AND MAGNESIUM CHLORIDE 526; 502; 368; 37; 30 MG/100ML; MG/100ML; MG/100ML; MG/100ML; MG/100ML
INJECTION, SOLUTION INTRAVENOUS CONTINUOUS
Status: DISPENSED | OUTPATIENT
Start: 2022-11-04 | End: 2022-11-04

## 2022-11-04 RX ADMIN — NAPROXEN 500 MG: 500 TABLET ORAL at 20:02

## 2022-11-04 RX ADMIN — ACETAMINOPHEN 1000 MG: 500 TABLET ORAL at 20:02

## 2022-11-04 RX ADMIN — SODIUM CHLORIDE, SODIUM GLUCONATE, SODIUM ACETATE, POTASSIUM CHLORIDE AND MAGNESIUM CHLORIDE: 526; 502; 368; 37; 30 INJECTION, SOLUTION INTRAVENOUS at 20:15

## 2022-11-04 ASSESSMENT — PAIN DESCRIPTION - LOCATION: LOCATION: GENERALIZED

## 2022-11-04 ASSESSMENT — PAIN SCALES - GENERAL: PAINLEVEL_OUTOF10: 10

## 2022-11-04 NOTE — ED PROVIDER NOTES
[address] [facility] RESIDENT NOTE       Date of evaluation: 11/4/2022    Chief Complaint     Generalized Body Aches (Body aches, chills, cough, started today )      History of Present Illness     Sp Stovall is a 38 y.o. female who presents with a chief complaint of Generalized Body Aches (Body aches, chills, cough, started today )    Past medical history notable for: Lupus spectrum disorder on hydroxychloroquine    Patient presents emergency department with full body pain and malaise. She has had some associated nausea. She notes that yesterday she had a bit of a scratchy throat and then today she has felt terrible all over. She notes she has had a few episodes of small emesis but has not really vomited much up. She also endorses a dry cough without significant sputum production. No hemoptysis. She took some Tylenol Cold and flu early this morning but otherwise has not taken much. Other than stated above, no additional aggravating or alleviating factors are noted. All nursing notes and triage notes were appropriately reviewed in the course of the creation of this note. Past Medical, Surgical, Family, and Social History     She has a past medical history of Depression. She has a past surgical history that includes Tubal ligation; Upper gastrointestinal endoscopy (N/A, 9/30/2022); and Colonoscopy (N/A, 9/30/2022). Her family history includes Asthma in an other family member; Breast Cancer in an other family member; No Known Problems in her father and mother; Seizures in an other family member. She reports that she has been smoking cigarettes. She has been smoking an average of 1 pack per day. She has never used smokeless tobacco. She reports current alcohol use. She reports current drug use. Drug: Marijuana Nan Gennaro). Review of Systems   A full 10 point review of systems was obtained.    Pertinent positives and negatives as documented in the HPI, otherwise all other systems were reviewed and were negative. Medications     Previous Medications    ACETAMINOPHEN (TYLENOL) 500 MG TABLET    Take 2 tablets by mouth 3 times daily as needed for Pain    CLOBETASOL (TEMOVATE) 0.05 % OINTMENT    Apply to affected areas twice daily    FERROUS SULFATE (IRON 325) 325 (65 FE) MG TABLET    Take 325 mg by mouth daily (with breakfast)    GABAPENTIN (NEURONTIN) 300 MG CAPSULE    Take 1 capsule by mouth in the morning and 1 capsule at noon and 1 capsule before bedtime. Do all this for 14 days. HYDROXYCHLOROQUINE (PLAQUENIL) 200 MG TABLET    Take 1 pill Monday, Tuesday, Wednesday and Thursday and 2 pills Friday, Saturday and Sunday    MYCOPHENOLATE (CELLCEPT) 500 MG TABLET    Take 1,000 mg by mouth in the morning and 1,000 mg before bedtime. NAPROXEN SODIUM (ALEVE PO)    Take by mouth       Allergies     She has No Known Allergies. Physical Exam     INITIAL VITALS:   BP: (!) 123/90, Temp: (!) 101.6 °F (38.7 °C), Heart Rate: 92, Resp: 20, SpO2: 97 %     Physical Exam  Constitutional:       Comments: Fatigued appearing   HENT:      Head: Normocephalic and atraumatic. Right Ear: External ear normal.      Left Ear: External ear normal.      Nose: Congestion present. Mouth/Throat:      Mouth: Mucous membranes are moist.      Pharynx: Posterior oropharyngeal erythema present. No oropharyngeal exudate. Eyes:      General: No scleral icterus. Extraocular Movements: Extraocular movements intact. Pupils: Pupils are equal, round, and reactive to light. Cardiovascular:      Rate and Rhythm: Normal rate and regular rhythm. Pulses: Normal pulses. Pulmonary:      Effort: No respiratory distress. Breath sounds: No wheezing. Comments: Dry cough  Abdominal:      General: Abdomen is flat. Palpations: Abdomen is soft. There is no mass. Tenderness: There is no abdominal tenderness. There is no guarding or rebound.    Musculoskeletal: General: No deformity or signs of injury. Right lower leg: No edema. Left lower leg: No edema. Skin:     General: Skin is warm and dry. Findings: No bruising, erythema or rash. Neurological:      General: No focal deficit present. Mental Status: She is alert and oriented to person, place, and time. Cranial Nerves: No cranial nerve deficit. Psychiatric:         Mood and Affect: Mood normal.         Behavior: Behavior normal.        Diagnostic Results     LABS AND RADIOLOGY  Please see electronic medical record for any tests performed in the ED. All results were reviewed by me during the course of the patient's emergency department stay. EKG   If performed, please see interpretation in MDM below. ED BEDSIDE ULTRASOUND:  If performed, please see interpretation in MDM below and documentation in [facility]. Procedures     none    ED Course   Past Medical Hx, Past Surgical Hx, Social Hx, Allergies, and Family Hx were reviewed. The patient was given the following medications:  Medications   electrolyte-A (PLASMALYTE-A) solution ( IntraVENous Stopped 11/4/22 2120)   acetaminophen (TYLENOL) tablet 1,000 mg (1,000 mg Oral Given 11/4/22 2002)   naproxen (NAPROSYN) tablet 500 mg (500 mg Oral Given 11/4/22 2002)       CONSULTS:  None    MEDICAL DECISION MAKING / ASSESSMENT / PLAN     Demarco James is a 38 y.o. female with a history and presentation as described above. This patient was also evaluated by the attending physician. All care plans werediscussed and agreed upon. Patient arrives to the emergency department with fever, malaise, myalgias. On exam, she is fatigued appearing but is relatively nontoxic. Her temperature here initially is 100.6 Fahrenheit. She was empirically given Tylenol and naproxen and her fever improved. The patient is not hypoxic and she has clear lungs. Her chest x-ray is unremarkable.   Her metabolic panel is also relatively unremarkable. She was given a liter of fluids and reassessed. She appears somewhat improved. Notably, influenza A is positive. She completed a walk test and ambulated without much difficulty and had oxygen saturations in the 96-97 range on room air. She will be discharged in stable condition with instructions to follow-up with her primary care provider and continue to take Tylenol/naproxen as well as remain well-hydrated. ED Course as of 11/04/22 2241 Fri Nov 04, 2022 2033 XR CHEST PORTABLE  IMPRESSION:     No evidence for acute cardiopulmonary disease.     [AF]   2034 WBC: 4.7 [AF]   2145 INFLUENZA A(!): DETECTED [AF]   2145 Temp: 99.6 °F (37.6 °C) [AF]   2145 BP: 120/85 [AF]   2145 Heart Rate: 90 [AF]   2145 SpO2: 93 % [AF]      ED Course User Index  [AF] Bhavana Pacheco MD        Plan: At this time, the patient was deemed appropriate for discharge. Discharge instructions, including strict return precautions for new or worsening symptoms concerning to the patient, were provided. All of her questions were answered satisfactorily, and she was subsequently sent home in stable condition. The patient is instructed to return to the emergency department should her symptoms worsen or any concern she believes warrants acute physician evaluation. Kyree Collier MD, PGY-3   Emergency Medicine    Clinical Impression     1. Influenza A        Disposition     PATIENT REFERRED TO:  No follow-up provider specified.     DISCHARGE MEDICATIONS:  New Prescriptions    No medications on file       DISPOSITION Decision To Discharge 11/04/2022 10:39:30 Tiny Fairbanks MD  Resident  11/04/22 0619

## 2022-11-05 NOTE — ED PROVIDER NOTES
ED Attending Attestation Note     Date of evaluation: 11/4/2022    This patient was seen by the resident. I have seen and examined the patient, agree with the workup, evaluation, management and diagnosis. The care plan has been discussed. My assessment reveals a 57-year-old -American female who presents with a fever and generalized myalgias. No respiratory distress or increased respiratory effort with unlabored breathing on examination. Was initially febrile but defervesced. Flu A positive. John Beaulieu MD  11/04/22 2123

## 2022-11-05 NOTE — ED NOTES
Walking pulse ox completed. States remained 96-97% room air with ambulation. Denies shortness of breath.       Irais Shankar RN  11/04/22 0700

## 2022-11-05 NOTE — ED NOTES
Discharge instructions given per provider order. Patient verbalized understanding.         Albert Shelley RN  11/04/22 5790

## 2023-03-28 ENCOUNTER — HOSPITAL ENCOUNTER (EMERGENCY)
Age: 43
Discharge: HOME | End: 2023-03-28
Attending: EMERGENCY MEDICINE
Payer: COMMERCIAL

## 2023-03-28 VITALS
HEART RATE: 80 BPM | WEIGHT: 123.46 LBS | RESPIRATION RATE: 16 BRPM | BODY MASS INDEX: 19.38 KG/M2 | SYSTOLIC BLOOD PRESSURE: 130 MMHG | DIASTOLIC BLOOD PRESSURE: 90 MMHG | TEMPERATURE: 98.9 F | HEIGHT: 67 IN | OXYGEN SATURATION: 100 %

## 2023-03-28 DIAGNOSIS — S05.02XA ABRASION OF LEFT CORNEA, INITIAL ENCOUNTER: Primary | ICD-10-CM

## 2023-03-28 PROCEDURE — 6370000000 HC RX 637 (ALT 250 FOR IP): Performed by: STUDENT IN AN ORGANIZED HEALTH CARE EDUCATION/TRAINING PROGRAM

## 2023-03-28 PROCEDURE — 99283 EMERGENCY DEPT VISIT LOW MDM: CPT

## 2023-03-28 RX ORDER — ERYTHROMYCIN 5 MG/G
OINTMENT OPHTHALMIC
Qty: 1 G | Refills: 0 | Status: SHIPPED | OUTPATIENT
Start: 2023-03-28 | End: 2023-04-07

## 2023-03-28 RX ORDER — TETRACAINE HYDROCHLORIDE 5 MG/ML
1 SOLUTION OPHTHALMIC ONCE
Status: COMPLETED | OUTPATIENT
Start: 2023-03-28 | End: 2023-03-28

## 2023-03-28 RX ORDER — ERYTHROMYCIN 5 MG/G
OINTMENT OPHTHALMIC ONCE
Status: DISCONTINUED | OUTPATIENT
Start: 2023-03-28 | End: 2023-03-28

## 2023-03-28 RX ADMIN — FLUORESCEIN SODIUM 1 MG: 1 STRIP OPHTHALMIC at 10:25

## 2023-03-28 RX ADMIN — TETRACAINE HYDROCHLORIDE 1 DROP: 5 SOLUTION OPHTHALMIC at 10:25

## 2023-03-28 ASSESSMENT — PAIN DESCRIPTION - FREQUENCY: FREQUENCY: CONTINUOUS

## 2023-03-28 ASSESSMENT — PAIN DESCRIPTION - ONSET: ONSET: AWAKENED FROM SLEEP

## 2023-03-28 ASSESSMENT — PAIN DESCRIPTION - PAIN TYPE: TYPE: ACUTE PAIN

## 2023-03-28 ASSESSMENT — PAIN DESCRIPTION - ORIENTATION: ORIENTATION: LEFT

## 2023-03-28 ASSESSMENT — PAIN - FUNCTIONAL ASSESSMENT
PAIN_FUNCTIONAL_ASSESSMENT: PREVENTS OR INTERFERES SOME ACTIVE ACTIVITIES AND ADLS
PAIN_FUNCTIONAL_ASSESSMENT: 0-10
PAIN_FUNCTIONAL_ASSESSMENT: NONE - DENIES PAIN

## 2023-03-28 ASSESSMENT — PAIN DESCRIPTION - LOCATION: LOCATION: EYE

## 2023-03-28 ASSESSMENT — PAIN SCALES - GENERAL: PAINLEVEL_OUTOF10: 7

## 2023-03-28 ASSESSMENT — PAIN DESCRIPTION - DESCRIPTORS: DESCRIPTORS: DISCOMFORT

## 2023-03-28 NOTE — ED PROVIDER NOTES
ED Attending Attestation Note     Date of evaluation: 3/28/2023    This patient was seen by the resident. I have seen and examined the patient, agree with the workup, evaluation, management and diagnosis. The care plan has been discussed. I was present for any procedures performed in the resident's  note and have made edits to the note where appropriate. My assessment reveals 38 y.o. female presenting for left eye pain after getting make-up in her eye 2 days ago. She has a large corneal defect consistent with corneal abrasion centrally over the left eye without suggestion of ulceration and no purulence or drainage. Anterior chamber is quiet, no hypopyon, cell, or flare.        Daniel Martin MD  03/28/23 7756
mg by mouth daily (with breakfast)Historical Med      mycophenolate (CELLCEPT) 500 MG tablet Take 1,000 mg by mouth in the morning and 1,000 mg before bedtime. Historical Med      acetaminophen (TYLENOL) 500 MG tablet Take 2 tablets by mouth 3 times daily as needed for Pain, Disp-180 tablet, R-0Print      gabapentin (NEURONTIN) 300 MG capsule Take 1 capsule by mouth in the morning and 1 capsule at noon and 1 capsule before bedtime. Do all this for 14 days. , Disp-42 capsule, R-0Print      hydroxychloroquine (PLAQUENIL) 200 MG tablet Take 1 pill Monday, Tuesday, Wednesday and Thursday and 2 pills Friday, Saturday and Sunday, Disp-50 tablet, R-3Normal      clobetasol (TEMOVATE) 0.05 % ointment Apply to affected areas twice daily, Disp-60 g, R-2, Normal      Naproxen Sodium (ALEVE PO) Take by mouthHistorical Med             Allergies     She has No Known Allergies. Physical Exam     INITIAL VITALS: BP: (!) 132/93, Temp: 98.9 °F (37.2 °C), Heart Rate: 85, Resp: 18, SpO2: 98 %     Physical exam  General: well-appearing, no acute distress  HEENT: Conjunctival injection OS, uptake of fluorescein at the 6 o'clock position OS consistent with corneal abrasion, no clouding or other evidence of corneal ulcer, no foreign body, no hyphema or hypopyon. No proptosis. Lids are normal in appearance. Mild clear drainage OS without purulence. OD normal in appearance. Cardiovascular: regular rate and rhythm  Pulmonary: non-labored breathing, speaks in full sentences  Abdominal: non-distended  Musculoskeletal: no long bone deformity  Extremities: no peripheral edema  Skin: dry, no rashes or lesions  Neuro: alert and oriented, speech and mentation normal, no focal deficits, gait narrow and stable    DiagnosticResults     RADIOLOGY:  No orders to display       LABS:   No results found for this visit on 03/28/23.       RECENT VITALS:  BP: (!) 130/90, Temp: 98.9 °F (37.2 °C), Heart Rate: 80,Resp: 16, SpO2: 100 %     Procedures       ED

## 2023-03-28 NOTE — Clinical Note
Everette Means was seen and treated in our emergency department on 3/28/2023. She may return to work on 03/30/2023. If you have any questions or concerns, please don't hesitate to call.       Rose Rodriguez MD

## 2023-03-28 NOTE — DISCHARGE INSTRUCTIONS
You were seen in the emergency department for corneal abrasion. You are being given a medication called erythromycin ointment which you should use 3-4 times a day for the next 4 days to ease pain and prevent infection. Follow-up with [facility] or return to the emergency department if your symptoms worsen. You need to see your regular doctor in 2 days to be checked. [place] [address] [facility]- The clinic, located on the third floor of the [facility] office, is open Monday through Friday from 8 a.m. to 5 p.m. and Saturday from 8 a.m. to 12 p.m. Patients may call [phone]  or [phone] to schedule an appointment.

## 2023-03-28 NOTE — Clinical Note
Fredi Smith was seen and treated in our emergency department on 3/28/2023. She may return to work on 03/30/2023. If you have any questions or concerns, please don't hesitate to call.       Maurizio Barros MD

## 2023-03-28 NOTE — ED NOTES
Pt discharged to home. Discharge instructions including 1 prescriptions reviewed with patient. All questions answered, no concerns noted. Pt encouraged to return to emergency department if they have any new or worsening symptoms. Pt alert and oriented, resp even and unlabored, skin natural in color, no signs of acute distress.           Susy Boogie RN  03/28/23 1111

## 2023-03-28 NOTE — Clinical Note
Archana Mills was seen and treated in our emergency department on 3/28/2023. She may return to work on 03/30/2023. If you have any questions or concerns, please don't hesitate to call.       Cosme Handy MD

## 2023-09-12 ENCOUNTER — OFFICE VISIT (OUTPATIENT)
Dept: PRIMARY CARE CLINIC | Age: 43
End: 2023-09-12
Payer: COMMERCIAL

## 2023-09-12 VITALS
WEIGHT: 117.8 LBS | OXYGEN SATURATION: 100 % | HEART RATE: 71 BPM | DIASTOLIC BLOOD PRESSURE: 91 MMHG | BODY MASS INDEX: 18.45 KG/M2 | SYSTOLIC BLOOD PRESSURE: 146 MMHG

## 2023-09-12 DIAGNOSIS — A04.8 H. PYLORI INFECTION: ICD-10-CM

## 2023-09-12 DIAGNOSIS — Z12.31 ENCOUNTER FOR SCREENING MAMMOGRAM FOR MALIGNANT NEOPLASM OF BREAST: ICD-10-CM

## 2023-09-12 DIAGNOSIS — D50.9 MICROCYTIC ANEMIA: ICD-10-CM

## 2023-09-12 DIAGNOSIS — Z00.00 ANNUAL PHYSICAL EXAM: ICD-10-CM

## 2023-09-12 DIAGNOSIS — Z13.31 POSITIVE DEPRESSION SCREENING: ICD-10-CM

## 2023-09-12 DIAGNOSIS — Z00.00 ANNUAL PHYSICAL EXAM: Primary | ICD-10-CM

## 2023-09-12 PROBLEM — L93.2 LUPUS PANNICULITIS: Status: RESOLVED | Noted: 2022-01-04 | Resolved: 2023-09-12

## 2023-09-12 PROCEDURE — 99396 PREV VISIT EST AGE 40-64: CPT | Performed by: FAMILY MEDICINE

## 2023-09-12 PROCEDURE — G8427 DOCREV CUR MEDS BY ELIG CLIN: HCPCS | Performed by: FAMILY MEDICINE

## 2023-09-12 PROCEDURE — G8419 CALC BMI OUT NRM PARAM NOF/U: HCPCS | Performed by: FAMILY MEDICINE

## 2023-09-12 PROCEDURE — 99213 OFFICE O/P EST LOW 20 MIN: CPT | Performed by: FAMILY MEDICINE

## 2023-09-12 PROCEDURE — 4004F PT TOBACCO SCREEN RCVD TLK: CPT | Performed by: FAMILY MEDICINE

## 2023-09-12 RX ORDER — CLARITHROMYCIN 500 MG/1
500 TABLET, COATED ORAL 2 TIMES DAILY
Qty: 28 TABLET | Refills: 0 | Status: SHIPPED | OUTPATIENT
Start: 2023-09-12 | End: 2023-09-26

## 2023-09-12 RX ORDER — AMOXICILLIN 500 MG/1
1000 CAPSULE ORAL 2 TIMES DAILY
Qty: 56 CAPSULE | Refills: 0 | Status: SHIPPED | OUTPATIENT
Start: 2023-09-12 | End: 2023-09-26

## 2023-09-12 RX ORDER — OMEPRAZOLE 20 MG/1
20 CAPSULE, DELAYED RELEASE ORAL 2 TIMES DAILY
Qty: 28 CAPSULE | Refills: 0 | Status: SHIPPED | OUTPATIENT
Start: 2023-09-12 | End: 2023-09-26

## 2023-09-12 ASSESSMENT — PATIENT HEALTH QUESTIONNAIRE - PHQ9
5. POOR APPETITE OR OVEREATING: 1
8. MOVING OR SPEAKING SO SLOWLY THAT OTHER PEOPLE COULD HAVE NOTICED. OR THE OPPOSITE, BEING SO FIGETY OR RESTLESS THAT YOU HAVE BEEN MOVING AROUND A LOT MORE THAN USUAL: 0
1. LITTLE INTEREST OR PLEASURE IN DOING THINGS: 2
6. FEELING BAD ABOUT YOURSELF - OR THAT YOU ARE A FAILURE OR HAVE LET YOURSELF OR YOUR FAMILY DOWN: 3
4. FEELING TIRED OR HAVING LITTLE ENERGY: 3
SUM OF ALL RESPONSES TO PHQ9 QUESTIONS 1 & 2: 4
7. TROUBLE CONCENTRATING ON THINGS, SUCH AS READING THE NEWSPAPER OR WATCHING TELEVISION: 1
SUM OF ALL RESPONSES TO PHQ QUESTIONS 1-9: 15
SUM OF ALL RESPONSES TO PHQ QUESTIONS 1-9: 16
SUM OF ALL RESPONSES TO PHQ QUESTIONS 1-9: 16
2. FEELING DOWN, DEPRESSED OR HOPELESS: 2
9. THOUGHTS THAT YOU WOULD BE BETTER OFF DEAD, OR OF HURTING YOURSELF: 1
SUM OF ALL RESPONSES TO PHQ QUESTIONS 1-9: 16
3. TROUBLE FALLING OR STAYING ASLEEP: 3

## 2023-09-12 ASSESSMENT — COLUMBIA-SUICIDE SEVERITY RATING SCALE - C-SSRS
2. HAVE YOU ACTUALLY HAD ANY THOUGHTS OF KILLING YOURSELF?: NO
1. WITHIN THE PAST MONTH, HAVE YOU WISHED YOU WERE DEAD OR WISHED YOU COULD GO TO SLEEP AND NOT WAKE UP?: NO
6. HAVE YOU EVER DONE ANYTHING, STARTED TO DO ANYTHING, OR PREPARED TO DO ANYTHING TO END YOUR LIFE?: NO

## 2023-09-12 NOTE — PROGRESS NOTES
[address] [facility]. PRIMARY CARE  [address] [facility] [address]  Dept: [phone]  Dept Fax: [phone]     9/12/2023      Quin Balderas   1980     Chief Complaint   Patient presents with    Annual Exam       HPI    Pt comes in today for annual physical.     EGD/colonoscopy 2022. + H. Pylori. States she never took the abx because she was told she couldn't take her Lupus medication if she did. She never followed up with GI. Notes she is having some stomach discomfort, but thinks it is due to drinking too much alcohol on Friday. Wt Readings from Last 5 Encounters:   09/12/23 117 lb 12.8 oz (53.4 kg)   03/28/23 123 lb 7.3 oz (56 kg)   09/30/22 120 lb (54.4 kg)   09/08/22 117 lb 6.4 oz (53.3 kg)   08/03/22 117 lb 3.2 oz (53.2 kg)      BP Readings from Last 5 Encounters:   09/12/23 (!) 146/91   03/28/23 (!) 130/90   11/04/22 120/85   09/30/22 (!) 148/106   09/08/22 116/73       Unsure of last pap smear. Has IUD in place. OBGYN Dr. Milton Velasquez. Has never had mammogram.     C/o depression. Stress at home due to kids/grand kids. Relationship issues with . Was previously under the care of [facility]. Last treated in 2021 - Cymbalta and Remeron. Does not wish to go back on medication at this time. PHQ-9 Total Score: 16 (9/12/2023 11:48 AM)  Thoughts that you would be better off dead, or of hurting yourself in some way: 1 (9/12/2023 11:48 AM)       Prior to Visit Medications    Medication Sig Taking? Authorizing Provider   ferrous sulfate (IRON 325) 325 (65 Fe) MG tablet Take 325 mg by mouth daily (with breakfast)  Historical Provider, MD   mycophenolate (CELLCEPT) 500 MG tablet Take 1,000 mg by mouth in the morning and 1,000 mg before bedtime.   Historical Provider, MD   acetaminophen (TYLENOL) 500 MG tablet Take 2 tablets by mouth 3 times daily as needed for Pain  Ana Bermeo MD   gabapentin (NEURONTIN) 300 MG capsule Take 1 capsule by mouth in the

## 2023-09-13 LAB
ALBUMIN SERPL-MCNC: 4.3 G/DL (ref 3.4–5)
ALBUMIN/GLOB SERPL: 1.3 {RATIO} (ref 1.1–2.2)
ALP SERPL-CCNC: 69 U/L (ref 40–129)
ALT SERPL-CCNC: 8 U/L (ref 10–40)
ANION GAP SERPL CALCULATED.3IONS-SCNC: 10 MMOL/L (ref 3–16)
AST SERPL-CCNC: 14 U/L (ref 15–37)
BASOPHILS # BLD: 0.1 K/UL (ref 0–0.2)
BASOPHILS NFR BLD: 2.2 %
BILIRUB SERPL-MCNC: <0.2 MG/DL (ref 0–1)
BUN SERPL-MCNC: 6 MG/DL (ref 7–20)
CALCIUM SERPL-MCNC: 9.2 MG/DL (ref 8.3–10.6)
CHLORIDE SERPL-SCNC: 103 MMOL/L (ref 99–110)
CHOLEST SERPL-MCNC: 173 MG/DL (ref 0–199)
CO2 SERPL-SCNC: 24 MMOL/L (ref 21–32)
CREAT SERPL-MCNC: 0.6 MG/DL (ref 0.6–1.1)
DEPRECATED RDW RBC AUTO: 18.9 % (ref 12.4–15.4)
EOSINOPHIL # BLD: 0.1 K/UL (ref 0–0.6)
EOSINOPHIL NFR BLD: 1.8 %
EST. AVERAGE GLUCOSE BLD GHB EST-MCNC: 105.4 MG/DL
GFR SERPLBLD CREATININE-BSD FMLA CKD-EPI: >60 ML/MIN/{1.73_M2}
GLUCOSE SERPL-MCNC: 80 MG/DL (ref 70–99)
HBA1C MFR BLD: 5.3 %
HCT VFR BLD AUTO: 31.6 % (ref 36–48)
HDLC SERPL-MCNC: 77 MG/DL (ref 40–60)
HGB BLD-MCNC: 10.6 G/DL (ref 12–16)
HIV 1+2 AB+HIV1 P24 AG SERPL QL IA: NORMAL
HIV 2 AB SERPL QL IA: NORMAL
HIV1 AB SERPL QL IA: NORMAL
HIV1 P24 AG SERPL QL IA: NORMAL
IRON SATN MFR SERPL: 5 % (ref 15–50)
IRON SERPL-MCNC: 21 UG/DL (ref 37–145)
LDL CHOLESTEROL CALCULATED: 81 MG/DL
LYMPHOCYTES # BLD: 1.6 K/UL (ref 1–5.1)
LYMPHOCYTES NFR BLD: 42.3 %
MCH RBC QN AUTO: 26.8 PG (ref 26–34)
MCHC RBC AUTO-ENTMCNC: 33.4 G/DL (ref 31–36)
MCV RBC AUTO: 80.1 FL (ref 80–100)
MONOCYTES # BLD: 0.3 K/UL (ref 0–1.3)
MONOCYTES NFR BLD: 8.1 %
NEUTROPHILS # BLD: 1.7 K/UL (ref 1.7–7.7)
NEUTROPHILS NFR BLD: 45.6 %
PLATELET # BLD AUTO: 354 K/UL (ref 135–450)
PMV BLD AUTO: 7.6 FL (ref 5–10.5)
POTASSIUM SERPL-SCNC: 4.4 MMOL/L (ref 3.5–5.1)
PROT SERPL-MCNC: 7.6 G/DL (ref 6.4–8.2)
RBC # BLD AUTO: 3.94 M/UL (ref 4–5.2)
SODIUM SERPL-SCNC: 137 MMOL/L (ref 136–145)
TIBC SERPL-MCNC: 405 UG/DL (ref 260–445)
TRIGL SERPL-MCNC: 75 MG/DL (ref 0–150)
TSH SERPL DL<=0.005 MIU/L-ACNC: 0.81 UIU/ML (ref 0.27–4.2)
VLDLC SERPL CALC-MCNC: 15 MG/DL
WBC # BLD AUTO: 3.7 K/UL (ref 4–11)

## 2023-09-14 ENCOUNTER — TELEPHONE (OUTPATIENT)
Dept: PRIMARY CARE CLINIC | Age: 43
End: 2023-09-14

## 2023-09-14 LAB
HCV AB S/CO SERPL IA: 0.04 IV
HCV AB SERPL QL IA: NEGATIVE

## 2023-09-14 ASSESSMENT — ENCOUNTER SYMPTOMS
DIARRHEA: 0
SHORTNESS OF BREATH: 0
VOMITING: 0
ABDOMINAL PAIN: 1
NAUSEA: 0

## 2023-09-14 NOTE — TELEPHONE ENCOUNTER
Please let patient know that labs overall look normal. She is still anemic. I would recommend she start back on OTC iron supplement once daily with food.

## 2023-10-06 ENCOUNTER — HOSPITAL ENCOUNTER (OUTPATIENT)
Dept: MAMMOGRAPHY | Age: 43
Discharge: HOME | End: 2023-10-06
Payer: COMMERCIAL

## 2023-10-06 VITALS — BODY MASS INDEX: 18.36 KG/M2 | HEIGHT: 67 IN | WEIGHT: 117 LBS

## 2023-10-06 DIAGNOSIS — Z12.31 ENCOUNTER FOR SCREENING MAMMOGRAM FOR MALIGNANT NEOPLASM OF BREAST: ICD-10-CM

## 2023-10-06 PROCEDURE — 77063 BREAST TOMOSYNTHESIS BI: CPT

## 2024-02-26 ENCOUNTER — OFFICE VISIT (OUTPATIENT)
Dept: PRIMARY CARE CLINIC | Age: 44
End: 2024-02-26
Payer: COMMERCIAL

## 2024-02-26 VITALS
SYSTOLIC BLOOD PRESSURE: 124 MMHG | HEART RATE: 82 BPM | BODY MASS INDEX: 18.26 KG/M2 | WEIGHT: 116.6 LBS | DIASTOLIC BLOOD PRESSURE: 84 MMHG

## 2024-02-26 DIAGNOSIS — R29.898 WEAKNESS OF LEFT LEG: ICD-10-CM

## 2024-02-26 DIAGNOSIS — M54.17 LUMBOSACRAL RADICULOPATHY AT S1: Primary | ICD-10-CM

## 2024-02-26 PROCEDURE — 4004F PT TOBACCO SCREEN RCVD TLK: CPT | Performed by: FAMILY MEDICINE

## 2024-02-26 PROCEDURE — G8428 CUR MEDS NOT DOCUMENT: HCPCS | Performed by: FAMILY MEDICINE

## 2024-02-26 PROCEDURE — 99213 OFFICE O/P EST LOW 20 MIN: CPT | Performed by: FAMILY MEDICINE

## 2024-02-26 PROCEDURE — G8484 FLU IMMUNIZE NO ADMIN: HCPCS | Performed by: FAMILY MEDICINE

## 2024-02-26 PROCEDURE — G8419 CALC BMI OUT NRM PARAM NOF/U: HCPCS | Performed by: FAMILY MEDICINE

## 2024-02-26 RX ORDER — OXYCODONE HYDROCHLORIDE AND ACETAMINOPHEN 5; 325 MG/1; MG/1
1 TABLET ORAL EVERY 6 HOURS PRN
Qty: 20 TABLET | Refills: 0 | Status: SHIPPED | OUTPATIENT
Start: 2024-02-26 | End: 2024-03-02

## 2024-02-26 RX ORDER — METHOCARBAMOL 750 MG/1
750 TABLET, FILM COATED ORAL 2 TIMES DAILY PRN
Qty: 20 TABLET | Refills: 0 | Status: SHIPPED | OUTPATIENT
Start: 2024-02-26 | End: 2024-03-07

## 2024-02-26 RX ORDER — METHYLPREDNISOLONE 4 MG/1
TABLET ORAL
Qty: 1 KIT | Refills: 0 | Status: SHIPPED | OUTPATIENT
Start: 2024-02-26

## 2024-02-26 ASSESSMENT — ENCOUNTER SYMPTOMS: BACK PAIN: 1

## 2024-02-26 NOTE — PROGRESS NOTES
Topics    Alcohol use: Yes     Comment: BOTTLE OF LIQOUR DAILY    Drug use: Yes     Types: Marijuana (Weed)     Comment: occassional        Past Surgical History:   Procedure Laterality Date    COLONOSCOPY N/A 9/30/2022    COLONOSCOPY performed by Samm Xavier MD at [facility] ENDOSCOPY    TUBAL LIGATION      UPPER GASTROINTESTINAL ENDOSCOPY N/A 9/30/2022    EGD BIOPSY performed by Samm Xavier MD at [facility] ENDOSCOPY        No Known Allergies     Family History   Problem Relation Age of Onset    No Known Problems Mother     No Known Problems Father     Seizures Other     Asthma Other     Breast Cancer Maternal Aunt         Patient's past medical history, surgical history, family history, medications, and allergies  were all reviewed and updated as appropriate today.    Review of Systems   Constitutional:  Negative for fever.   Musculoskeletal:  Positive for back pain and gait problem.   Neurological:  Positive for weakness.       /84 (Site: Right Upper Arm, Position: Sitting)   Pulse 82   Wt 52.9 kg (116 lb 9.6 oz)   BMI 18.26 kg/m²      Physical Exam  Constitutional:       General: She is in acute distress.      Comments: Patient leaning over exam table in pain, unable to sit comfortably   Musculoskeletal:      Comments: Limited exam 2/2 patient level of pain, notable weakness on left side with hip flexion/extension, knee flexion/extension, antalgic gait   Neurological:      Mental Status: She is alert.         Assessment:  Encounter Diagnoses   Name Primary?    Lumbosacral radiculopathy at S1 Yes    Weakness of left leg        Plan:    - Concerning for worsening nerve impingement vs compression. Patient declined ER evaluation. Agreeable to urgent outpatient visit. [facility]. Kindly agreed to schedule patient for 1:15 tomorrow. Pain management plan discussed with patient. ER precautions for worsening pain, bowel/bladder incontinence or saddle anesthesia. She verbalized

## 2024-02-27 ENCOUNTER — OFFICE VISIT (OUTPATIENT)
Dept: ORTHOPEDIC SURGERY | Age: 44
End: 2024-02-27
Payer: COMMERCIAL

## 2024-02-27 VITALS — WEIGHT: 116 LBS | HEIGHT: 67 IN | BODY MASS INDEX: 18.21 KG/M2

## 2024-02-27 DIAGNOSIS — M51.26 HNP (HERNIATED NUCLEUS PULPOSUS), LUMBAR: ICD-10-CM

## 2024-02-27 DIAGNOSIS — M54.50 LUMBAR PAIN: Primary | ICD-10-CM

## 2024-02-27 PROCEDURE — G8484 FLU IMMUNIZE NO ADMIN: HCPCS | Performed by: PHYSICIAN ASSISTANT

## 2024-02-27 PROCEDURE — G8419 CALC BMI OUT NRM PARAM NOF/U: HCPCS | Performed by: PHYSICIAN ASSISTANT

## 2024-02-27 PROCEDURE — 99204 OFFICE O/P NEW MOD 45 MIN: CPT | Performed by: PHYSICIAN ASSISTANT

## 2024-02-27 PROCEDURE — 4004F PT TOBACCO SCREEN RCVD TLK: CPT | Performed by: PHYSICIAN ASSISTANT

## 2024-02-27 PROCEDURE — G8427 DOCREV CUR MEDS BY ELIG CLIN: HCPCS | Performed by: PHYSICIAN ASSISTANT

## 2024-02-27 RX ORDER — KETOROLAC TROMETHAMINE 10 MG/1
10 TABLET, FILM COATED ORAL EVERY 6 HOURS PRN
Qty: 20 TABLET | Refills: 0 | Status: SHIPPED | OUTPATIENT
Start: 2024-02-27

## 2024-02-27 RX ORDER — PREDNISONE 20 MG/1
TABLET ORAL
Qty: 20 TABLET | Refills: 0 | Status: SHIPPED | OUTPATIENT
Start: 2024-02-27

## 2024-02-27 NOTE — PROGRESS NOTES
New Patient: LUMBAR SPINE    Referring Provider:  Barbra Layne*    CHIEF COMPLAINT:    Chief Complaint   Patient presents with    Back Pain     lumbar       HISTORY OF PRESENT ILLNESS:       Ms. Nettie Toscano  is a pleasant 43 y.o. female here for evaluation regarding her LBP and left leg pain. She states her pain began after getting out of her car about 5 days ago.  Patient was seen yesterday by her PCP. Her pain has steadily continued since then. She rates her back pain 10/10, left buttock pain 10/10, and left posterior leg pain to her foot 10/10.  She states that the pain is constant and severe and does interfere with her sleep at night.  She does have a sense of numbness and tingling and weakness into the left lower extremity.  She finds that sitting is worse than standing and lying and she has had previous episode of similar type pain over 2 years ago which resolved with time.  She denies any bowel or bladder dysfunction or saddle anesthesia.  She is presently taking a muscle relaxant and Percocet.   Pain Assessment  Location of Pain: Back  Location Modifiers: Other (Comment)  Quality of Pain: Other (Comment)  Duration of Pain: Other (Comment)  Frequency of Pain: Other (Comment)]  Current/Past Treatment:   Physical Therapy: None recently  Chiropractic: None  Injection: None  Medications: Oxycodone, Robaxin, MDP    Past Medical History:   Past Medical History:   Diagnosis Date    Depression         Past Surgical History:     Past Surgical History:   Procedure Laterality Date    COLONOSCOPY N/A 9/30/2022    COLONOSCOPY performed by Samm Xavier MD at [facility] ENDOSCOPY    TUBAL LIGATION      UPPER GASTROINTESTINAL ENDOSCOPY N/A 9/30/2022    EGD BIOPSY performed by Samm Xavier MD at [facility] ENDOSCOPY       Current Medications:     Current Outpatient Medications:     oxyCODONE-acetaminophen (PERCOCET) 5-325 MG per tablet, Take 1 tablet by mouth every 6 hours as needed for Pain for up to 5

## 2024-03-08 ENCOUNTER — HOSPITAL ENCOUNTER (OUTPATIENT)
Dept: MRI IMAGING | Age: 44
Discharge: HOME | End: 2024-03-08
Payer: COMMERCIAL

## 2024-03-08 DIAGNOSIS — M54.50 LUMBAR PAIN: ICD-10-CM

## 2024-03-08 DIAGNOSIS — M51.26 HNP (HERNIATED NUCLEUS PULPOSUS), LUMBAR: ICD-10-CM

## 2024-03-08 PROCEDURE — 72148 MRI LUMBAR SPINE W/O DYE: CPT

## 2024-04-07 ENCOUNTER — HOSPITAL ENCOUNTER (EMERGENCY)
Age: 44
Discharge: HOME | End: 2024-04-07
Attending: EMERGENCY MEDICINE
Payer: COMMERCIAL

## 2024-04-07 VITALS
BODY MASS INDEX: 19.12 KG/M2 | HEIGHT: 67 IN | RESPIRATION RATE: 16 BRPM | SYSTOLIC BLOOD PRESSURE: 142 MMHG | HEART RATE: 88 BPM | TEMPERATURE: 98.2 F | DIASTOLIC BLOOD PRESSURE: 95 MMHG | OXYGEN SATURATION: 98 % | WEIGHT: 121.8 LBS

## 2024-04-07 DIAGNOSIS — K08.89 PAIN, DENTAL: Primary | ICD-10-CM

## 2024-04-07 PROCEDURE — 6370000000 HC RX 637 (ALT 250 FOR IP): Performed by: EMERGENCY MEDICINE

## 2024-04-07 PROCEDURE — 99283 EMERGENCY DEPT VISIT LOW MDM: CPT

## 2024-04-07 RX ORDER — OXYCODONE HYDROCHLORIDE AND ACETAMINOPHEN 5; 325 MG/1; MG/1
1 TABLET ORAL EVERY 6 HOURS PRN
Qty: 8 TABLET | Refills: 0 | Status: SHIPPED | OUTPATIENT
Start: 2024-04-07 | End: 2024-04-09

## 2024-04-07 RX ORDER — IBUPROFEN 400 MG/1
800 TABLET ORAL ONCE
Status: COMPLETED | OUTPATIENT
Start: 2024-04-07 | End: 2024-04-07

## 2024-04-07 RX ORDER — OXYCODONE HYDROCHLORIDE 5 MG/1
5 TABLET ORAL ONCE
Status: COMPLETED | OUTPATIENT
Start: 2024-04-07 | End: 2024-04-07

## 2024-04-07 RX ORDER — AMOXICILLIN AND CLAVULANATE POTASSIUM 875; 125 MG/1; MG/1
1 TABLET, FILM COATED ORAL ONCE
Status: COMPLETED | OUTPATIENT
Start: 2024-04-07 | End: 2024-04-07

## 2024-04-07 RX ORDER — IBUPROFEN 600 MG/1
600 TABLET ORAL 3 TIMES DAILY PRN
Qty: 30 TABLET | Refills: 0 | Status: SHIPPED | OUTPATIENT
Start: 2024-04-07

## 2024-04-07 RX ORDER — AMOXICILLIN AND CLAVULANATE POTASSIUM 875; 125 MG/1; MG/1
1 TABLET, FILM COATED ORAL 2 TIMES DAILY
Qty: 14 TABLET | Refills: 0 | Status: SHIPPED | OUTPATIENT
Start: 2024-04-07 | End: 2024-04-14

## 2024-04-07 RX ADMIN — AMOXICILLIN AND CLAVULANATE POTASSIUM 1 TABLET: 875; 125 TABLET, FILM COATED ORAL at 08:15

## 2024-04-07 RX ADMIN — IBUPROFEN 800 MG: 400 TABLET, FILM COATED ORAL at 08:15

## 2024-04-07 RX ADMIN — OXYCODONE 5 MG: 5 TABLET ORAL at 08:15

## 2024-04-07 ASSESSMENT — PAIN DESCRIPTION - LOCATION: LOCATION: MOUTH

## 2024-04-07 ASSESSMENT — PAIN DESCRIPTION - ORIENTATION: ORIENTATION: LEFT

## 2024-04-07 ASSESSMENT — PAIN DESCRIPTION - FREQUENCY: FREQUENCY: CONTINUOUS

## 2024-04-07 ASSESSMENT — PAIN SCALES - GENERAL
PAINLEVEL_OUTOF10: 10
PAINLEVEL_OUTOF10: 10

## 2024-04-07 ASSESSMENT — PAIN DESCRIPTION - PAIN TYPE: TYPE: ACUTE PAIN

## 2024-04-07 ASSESSMENT — PAIN DESCRIPTION - DESCRIPTORS: DESCRIPTORS: BURNING;THROBBING

## 2024-04-07 ASSESSMENT — PAIN DESCRIPTION - ONSET: ONSET: ON-GOING

## 2024-04-07 ASSESSMENT — PAIN - FUNCTIONAL ASSESSMENT: PAIN_FUNCTIONAL_ASSESSMENT: 0-10

## 2024-04-07 NOTE — ED PROVIDER NOTES
RADIOLOGY:  No orders to display       LABS:   No results found for this visit on 04/07/24.  EKG       ED BEDSIDE ULTRASOUND:  No results found.    MOST RECENT VITALS:  BP: (!) 142/95,Temp: 98.2 °F (36.8 °C), Pulse: 88, Respirations: 16, SpO2: 98 %     Procedures         ED Course     Nursing Notes, Past Medical Hx, Past Surgical Hx, Social Hx,Allergies, and Family Hx were reviewed.         The patient was given the following medications:  Orders Placed This Encounter   Medications    oxyCODONE (ROXICODONE) immediate release tablet 5 mg    ibuprofen (ADVIL;MOTRIN) tablet 800 mg    amoxicillin-clavulanate (AUGMENTIN) 875-125 MG per tablet 1 tablet     Order Specific Question:   Antimicrobial Indications     Answer:   Head and Neck Infection    oxyCODONE-acetaminophen (PERCOCET) 5-325 MG per tablet     Sig: Take 1 tablet by mouth every 6 hours as needed for Pain for up to 8 doses. Intended supply: 3 days. Take lowest dose possible to manage pain Max Daily Amount: 4 tablets     Dispense:  8 tablet     Refill:  0    ibuprofen (ADVIL;MOTRIN) 600 MG tablet     Sig: Take 1 tablet by mouth 3 times daily as needed for Pain     Dispense:  30 tablet     Refill:  0    amoxicillin-clavulanate (AUGMENTIN) 875-125 MG per tablet     Sig: Take 1 tablet by mouth 2 times daily for 7 days     Dispense:  14 tablet     Refill:  0       CONSULTS:  None    Review of Systems     Review of Systems   Constitutional:  Negative for fever.   HENT:  Negative for facial swelling.    Respiratory: Negative.     Genitourinary: Negative.    All other systems reviewed and are negative.      Past Medical, Surgical, Family, and Social History     She has a past medical history of Depression.  She has a past surgical history that includes Tubal ligation; Upper gastrointestinal endoscopy (N/A, 9/30/2022); and Colonoscopy (N/A, 9/30/2022).  Her family history includes Asthma in an other family member; Breast Cancer in her maternal aunt; No Known

## 2024-05-30 ENCOUNTER — TELEPHONE (OUTPATIENT)
Dept: PRIMARY CARE CLINIC | Age: 44
End: 2024-05-30

## 2024-05-30 NOTE — TELEPHONE ENCOUNTER
Nurse Triage      Patient called in and asked to let us know that they went to a rheumatology appointment and there blood pressure was high 145/101 and was asked to follow up with there pcp for blood pressure scheduled for 6/3/24

## 2024-06-03 ENCOUNTER — OFFICE VISIT (OUTPATIENT)
Dept: PRIMARY CARE CLINIC | Age: 44
End: 2024-06-03

## 2024-06-03 VITALS
OXYGEN SATURATION: 98 % | HEART RATE: 68 BPM | BODY MASS INDEX: 18.17 KG/M2 | SYSTOLIC BLOOD PRESSURE: 123 MMHG | DIASTOLIC BLOOD PRESSURE: 81 MMHG | WEIGHT: 116 LBS

## 2024-06-03 DIAGNOSIS — L82.1 DERMATOSIS PAPULOSA NIGRA: ICD-10-CM

## 2024-06-03 DIAGNOSIS — F43.21 ADJUSTMENT DISORDER WITH DEPRESSED MOOD: Primary | ICD-10-CM

## 2024-06-03 PROCEDURE — 99214 OFFICE O/P EST MOD 30 MIN: CPT | Performed by: FAMILY MEDICINE

## 2024-06-03 RX ORDER — MIRTAZAPINE 7.5 MG/1
7.5 TABLET, FILM COATED ORAL NIGHTLY
Qty: 30 TABLET | Refills: 3 | Status: SHIPPED | OUTPATIENT
Start: 2024-06-03

## 2024-06-03 RX ORDER — HYDROXYCHLOROQUINE SULFATE 200 MG/1
200 TABLET, FILM COATED ORAL DAILY
COMMUNITY
Start: 2024-03-21

## 2024-06-03 RX ORDER — ERGOCALCIFEROL 1.25 MG/1
CAPSULE ORAL
COMMUNITY
Start: 2024-05-30

## 2024-06-03 RX ORDER — TACROLIMUS 1 MG/G
OINTMENT TOPICAL
COMMUNITY
Start: 2024-03-21

## 2024-06-03 SDOH — ECONOMIC STABILITY: HOUSING INSECURITY
IN THE LAST 12 MONTHS, WAS THERE A TIME WHEN YOU DID NOT HAVE A STEADY PLACE TO SLEEP OR SLEPT IN A SHELTER (INCLUDING NOW)?: NO

## 2024-06-03 SDOH — ECONOMIC STABILITY: FOOD INSECURITY: WITHIN THE PAST 12 MONTHS, YOU WORRIED THAT YOUR FOOD WOULD RUN OUT BEFORE YOU GOT MONEY TO BUY MORE.: SOMETIMES TRUE

## 2024-06-03 SDOH — ECONOMIC STABILITY: INCOME INSECURITY: HOW HARD IS IT FOR YOU TO PAY FOR THE VERY BASICS LIKE FOOD, HOUSING, MEDICAL CARE, AND HEATING?: SOMEWHAT HARD

## 2024-06-03 SDOH — ECONOMIC STABILITY: FOOD INSECURITY: WITHIN THE PAST 12 MONTHS, THE FOOD YOU BOUGHT JUST DIDN'T LAST AND YOU DIDN'T HAVE MONEY TO GET MORE.: SOMETIMES TRUE

## 2024-06-03 ASSESSMENT — PATIENT HEALTH QUESTIONNAIRE - PHQ9
3. TROUBLE FALLING OR STAYING ASLEEP: SEVERAL DAYS
8. MOVING OR SPEAKING SO SLOWLY THAT OTHER PEOPLE COULD HAVE NOTICED. OR THE OPPOSITE, BEING SO FIGETY OR RESTLESS THAT YOU HAVE BEEN MOVING AROUND A LOT MORE THAN USUAL: SEVERAL DAYS
SUM OF ALL RESPONSES TO PHQ QUESTIONS 1-9: 10
SUM OF ALL RESPONSES TO PHQ QUESTIONS 1-9: 9
SUM OF ALL RESPONSES TO PHQ9 QUESTIONS 1 & 2: 2
SUM OF ALL RESPONSES TO PHQ QUESTIONS 1-9: 10
1. LITTLE INTEREST OR PLEASURE IN DOING THINGS: SEVERAL DAYS
2. FEELING DOWN, DEPRESSED OR HOPELESS: SEVERAL DAYS
6. FEELING BAD ABOUT YOURSELF - OR THAT YOU ARE A FAILURE OR HAVE LET YOURSELF OR YOUR FAMILY DOWN: SEVERAL DAYS
5. POOR APPETITE OR OVEREATING: MORE THAN HALF THE DAYS
9. THOUGHTS THAT YOU WOULD BE BETTER OFF DEAD, OR OF HURTING YOURSELF: SEVERAL DAYS
7. TROUBLE CONCENTRATING ON THINGS, SUCH AS READING THE NEWSPAPER OR WATCHING TELEVISION: SEVERAL DAYS
SUM OF ALL RESPONSES TO PHQ QUESTIONS 1-9: 10
4. FEELING TIRED OR HAVING LITTLE ENERGY: SEVERAL DAYS
10. IF YOU CHECKED OFF ANY PROBLEMS, HOW DIFFICULT HAVE THESE PROBLEMS MADE IT FOR YOU TO DO YOUR WORK, TAKE CARE OF THINGS AT HOME, OR GET ALONG WITH OTHER PEOPLE: SOMEWHAT DIFFICULT

## 2024-06-03 ASSESSMENT — COLUMBIA-SUICIDE SEVERITY RATING SCALE - C-SSRS
6. HAVE YOU EVER DONE ANYTHING, STARTED TO DO ANYTHING, OR PREPARED TO DO ANYTHING TO END YOUR LIFE?: NO
1. WITHIN THE PAST MONTH, HAVE YOU WISHED YOU WERE DEAD OR WISHED YOU COULD GO TO SLEEP AND NOT WAKE UP?: YES
2. HAVE YOU ACTUALLY HAD ANY THOUGHTS OF KILLING YOURSELF?: NO

## 2024-06-03 NOTE — PATIENT INSTRUCTIONS
[email or web address]   Phone Number: 1-158.696.9139    [place] Department of Insurance  What they offer: Provide consumer protection through education and fair but vigilant regulation while promoting a stable and competitive environment for insurers  Website:  https://insurance.[place].gov/   Phone Number: 189.292.8346    Contact your Area Agency on Aging for information regarding waiver services and Medicaid based assistance for seniors and those with complex medical conditions.      Area Agencies on Aging (AAA)  [place] on Aging:   Counties Served: [place], [place], [place]  Address: [address], [facility], [address] / Phone: [phone]  Supports Offered  [facility]  Elderly Services Program ([place])  [facility]   Specialized Recovery Services   Assisted Living Waiver    Area on Aging District 7 (AAA7)  Counties Covered: [place], [place], [place], [place], [place], [place], [place], [place], and [place]  Address: [address], [facility] [address] / Phone: [phone]  Supports Offered:   Passport  Assisted Living Waiver  Consumer Directed Option Providers

## 2024-06-03 NOTE — PROGRESS NOTES
[facility]  [facility] PRIMARY CARE  [address] [place] [id number]  Dept: [phone]  Dept Fax: [phone]     6/3/2024      Nettie Toscano   1980     Chief Complaint   Patient presents with    Hypertension       HPI    Pt comes in today for follow up HTN. Was seen by Rheumatologist last week and /101. Was asymptomatic. Scheduled today for follow up. /81 here today. Rheumatologist recommended her to be on iron and vitamin D. States she only was given the Vitamin D, no iron Rx.     Has continued to be under a high amount of stress. Previously under the care of [facility]. Last treated 2021. Previously on remeron and cymbalta. PHQ9 at her physical in September = 16. No intent or plan to hurt herself. Sleep is poor. Will drink liquor to help her sleep. Has not been drinking as much lately but did drink 2-3 \"glasses of liquor\" each day over the weekend. + financial stress. Lost her social security. Is legal guardian to her grandchildren but gets little financial support. Ages 11 and 8. Daughter is pregnant with her 7th child and does not have custody of any of them. States her  makes too much money to qualify for food stamps.  supports her financially. She has no income at all.     Wt Readings from Last 5 Encounters:   06/03/24 52.6 kg (116 lb)   04/07/24 55.2 kg (121 lb 12.8 oz)   02/27/24 52.6 kg (116 lb)   02/26/24 52.9 kg (116 lb 9.6 oz)   10/06/23 53.1 kg (117 lb)      BP Readings from Last 5 Encounters:   06/03/24 123/81   04/07/24 (!) 142/95   02/26/24 124/84   09/12/23 (!) 146/91   03/28/23 (!) 130/90         PHQ-9 Total Score: 10 (6/3/2024 11:08 AM)  Thoughts that you would be better off dead, or of hurting yourself in some way: 1 (6/3/2024 11:08 AM)       Prior to Visit Medications    Medication Sig Taking? Authorizing Provider   ibuprofen (ADVIL;MOTRIN) 600 MG tablet Take 1 tablet by mouth 3 times daily as needed for Pain  Lisa,

## 2024-06-04 ENCOUNTER — COMMUNITY OUTREACH (OUTPATIENT)
Dept: OTHER | Age: 44
End: 2024-06-04

## 2024-06-04 NOTE — PROGRESS NOTES
Pt was referred to [facility] by her primary care provider, Dr. Barbra Layne, due to financial strain and difficulty accessing resources.  [facility] called and spoke briefly with Pt who stated she was driving at the time of the call.  Pt will contact [facility] later today.

## 2024-06-05 ENCOUNTER — COMMUNITY OUTREACH (OUTPATIENT)
Dept: OTHER | Age: 44
End: 2024-06-05

## 2024-06-05 NOTE — PROGRESS NOTES
Pt returned call from [facility] this date.  Pt indicated she may be losing her Medicaid coverage.  Pt's  is the primary bread winner of the household.  Though Pt was unable to provide 's income, she fears that she may now exceed Medicaid eligibility.  Pt is now working with  and has filed an appeal of her Medicaid benefits which were to  at the end of May.  EVELYN was able to verify on the Medicaid website that Pt's Medicaid remains active at this time.  EVELYN advised that Pt continue working with  until a final determination is made regarding her Medicaid eligibility.  EVELYN provided Pt with a few additional resources for food including the [employer] and [employer].  EVELYN also mailed information to Pt regarding the "[employer]" Financial Assistance process.  EVELYN strongly encouraged Pt to reach back out to [facility] if she has questions regarding anything discussed today.

## 2024-07-22 ENCOUNTER — TELEPHONE (OUTPATIENT)
Dept: PRIMARY CARE CLINIC | Age: 44
End: 2024-07-22

## 2024-07-22 NOTE — TELEPHONE ENCOUNTER
Informed Louisa that patients form was faxed back 7/17/24. Louisa informed patient of information. Closing encounter.

## 2025-01-16 ENCOUNTER — HOSPITAL ENCOUNTER (EMERGENCY)
Age: 45
Discharge: HOME | End: 2025-01-16
Attending: EMERGENCY MEDICINE
Payer: COMMERCIAL

## 2025-01-16 ENCOUNTER — APPOINTMENT (OUTPATIENT)
Dept: GENERAL RADIOLOGY | Age: 45
End: 2025-01-16
Payer: COMMERCIAL

## 2025-01-16 VITALS
HEIGHT: 67 IN | RESPIRATION RATE: 18 BRPM | WEIGHT: 119.1 LBS | OXYGEN SATURATION: 97 % | BODY MASS INDEX: 18.69 KG/M2 | HEART RATE: 93 BPM | DIASTOLIC BLOOD PRESSURE: 97 MMHG | SYSTOLIC BLOOD PRESSURE: 138 MMHG | TEMPERATURE: 98.1 F

## 2025-01-16 DIAGNOSIS — S93.602A SPRAIN OF LEFT FOOT, INITIAL ENCOUNTER: Primary | ICD-10-CM

## 2025-01-16 PROCEDURE — 73630 X-RAY EXAM OF FOOT: CPT

## 2025-01-16 PROCEDURE — 6370000000 HC RX 637 (ALT 250 FOR IP): Performed by: EMERGENCY MEDICINE

## 2025-01-16 PROCEDURE — 99283 EMERGENCY DEPT VISIT LOW MDM: CPT

## 2025-01-16 RX ORDER — ACETAMINOPHEN 500 MG
1000 TABLET ORAL ONCE
Status: COMPLETED | OUTPATIENT
Start: 2025-01-16 | End: 2025-01-16

## 2025-01-16 RX ADMIN — ACETAMINOPHEN 1000 MG: 500 TABLET, FILM COATED ORAL at 02:06

## 2025-01-16 ASSESSMENT — ENCOUNTER SYMPTOMS
EYES NEGATIVE: 1
RESPIRATORY NEGATIVE: 1
GASTROINTESTINAL NEGATIVE: 1

## 2025-01-16 ASSESSMENT — PAIN SCALES - GENERAL: PAINLEVEL_OUTOF10: 9

## 2025-01-16 ASSESSMENT — LIFESTYLE VARIABLES
HOW OFTEN DO YOU HAVE A DRINK CONTAINING ALCOHOL: 2-3 TIMES A WEEK
HOW MANY STANDARD DRINKS CONTAINING ALCOHOL DO YOU HAVE ON A TYPICAL DAY: 1 OR 2

## 2025-01-16 ASSESSMENT — PAIN DESCRIPTION - LOCATION: LOCATION: ANKLE;FOOT

## 2025-01-16 ASSESSMENT — PAIN DESCRIPTION - DESCRIPTORS: DESCRIPTORS: ACHING;SHARP

## 2025-01-16 ASSESSMENT — PAIN DESCRIPTION - ORIENTATION: ORIENTATION: LEFT

## 2025-01-16 ASSESSMENT — PAIN - FUNCTIONAL ASSESSMENT: PAIN_FUNCTIONAL_ASSESSMENT: 0-10

## 2025-01-16 NOTE — DISCHARGE INSTRUCTIONS
Your pain is likely due to a sprain of the ligaments on the outside of your foot.  Your x-ray showed no evidence of broken bone.  Use Tylenol or ibuprofen as needed for pain.  Ice and elevate the foot when possible to help with swelling.  Bear weight on your foot as you tolerate.  Follow-up with the orthopedic surgeon on-call for repeat evaluation and further management if symptoms continue.

## 2025-01-16 NOTE — ED PROVIDER NOTES
THE [facility]  EMERGENCY DEPARTMENT ENCOUNTER          ATTENDING PHYSICIAN NOTE       Date of evaluation: 1/16/2025    Chief Complaint     Foot Pain and Ankle Pain (Patient presents to ED with report of left foot and ankle pain. Patient reports the past started at 2100 yesterday, about 5 hours ago. Denies any known injury or trauma. Reports the pain to be on the dorsum of the left foot, extending up into the left ankle.)      History of Present Illness     Nettie Toscano is a 44 y.o. female who presents to the trauma complaining of left foot and ankle pain.  Patient states pain started approximately 4 hours prior to presentation.  She does not remember any specific injury but has a sharp pain located to the lateral aspect of the foot that radiates up into the ankle and towards the knee.  She states she has had difficulty ambulating secondary to the pain.  She has not tried taking anything for the pain.  She denies any specific trauma.  She does have a history of lupus and is on hydroxychloroquine but not on immunosuppressant medications otherwise.    ASSESSMENT / PLAN  (MEDICAL DECISION MAKING)     INITIAL VITALS: BP: (!) 138/97, Temp: 98.1 °F (36.7 °C), Pulse: 93, Respirations: 18, SpO2: 97 %      Nettie Toscano is a 44 y.o. female with history of lupus on hydroxychloroquine presents complaining of left foot pain.  Patient does not remember any specific injury but has been having pain to the foot making it difficult for her to walk.  She did not try taking thing for the pain.  On arrival, patient is hemodynamically stable.  She does have reproducible tenderness to the lateral aspect of the left foot with no obvious bony deformity.  X-ray of the foot demonstrates no acute osseous abnormalities.  Most likely the patient's pain is due to a sprain of the ligaments of the foot.  She has no evidence of fracture so I do not believe splinting is indicated but she will be given an Ace wrap for

## (undated) DEVICE — CANNULA SAMP CO2 AD GRN 7FT CO2 AND 7FT O2 TBNG UNIV CONN

## (undated) DEVICE — FORCEPS BX L240CM JAW DIA2.4MM ORNG L CAP W/ NDL DISP RAD